# Patient Record
Sex: MALE | Race: WHITE | NOT HISPANIC OR LATINO | Employment: OTHER | URBAN - METROPOLITAN AREA
[De-identification: names, ages, dates, MRNs, and addresses within clinical notes are randomized per-mention and may not be internally consistent; named-entity substitution may affect disease eponyms.]

---

## 2017-08-21 ENCOUNTER — OFFICE VISIT (OUTPATIENT)
Dept: URGENT CARE | Facility: RETAIL CLINIC | Age: 42
End: 2017-08-21
Payer: COMMERCIAL

## 2017-08-21 VITALS
DIASTOLIC BLOOD PRESSURE: 91 MMHG | SYSTOLIC BLOOD PRESSURE: 144 MMHG | TEMPERATURE: 98.5 F | OXYGEN SATURATION: 98 % | HEART RATE: 95 BPM
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
## 2017-08-21 PROCEDURE — 99202 OFFICE O/P NEW SF 15 MIN: CPT | Performed by: NURSE PRACTITIONER

## 2017-08-21 RX ORDER — PREDNISONE 20 MG/1
40 TABLET ORAL DAILY
Qty: 10 TABLET | Refills: 0 | Status: SHIPPED | OUTPATIENT
Start: 2017-08-21 | End: 2017-08-26

## 2017-08-21 RX ORDER — TRIAMCINOLONE ACETONIDE 1 MG/G
OINTMENT TOPICAL
Qty: 80 G | Refills: 0 | Status: SHIPPED | OUTPATIENT
Start: 2017-08-21 | End: 2017-11-22

## 2017-08-21 NOTE — PROGRESS NOTES
SUBJECTIVE  Beny Nelson is a 42 year old male who presents with a chief complaint of an animal bite on the right hand finger.  .jose c was bitten by a bee or spider yesterday.   Cicumstances of bite: unprovoked attack.  Severity: mild, moderate.    Associated symptoms: redness noted, swelling and edema at site    tetanus status unknown to the patient    Past Medical History:   Diagnosis Date     Bariatric surgery status        Current Outpatient Prescriptions:      VIAGRA 100 MG tablet, TAKE 1/2 TABLET BY MOUTH ONCE A DAY AS NEEDED - TAKE AS DIRECTED. (Patient not taking: Reported on 8/21/2017), Disp: 10 tablet, Rfl: 5     ibuprofen (ADVIL) 200 MG tablet, Take 200 mg by mouth every 4 hours as needed., Disp: , Rfl:   History   Smoking Status     Never Smoker   Smokeless Tobacco     Never Used     Comment: no smokers in household     ROS:  Review of systems negative except as stated above.    OBJECTIVE:  BP (!) 144/91  Pulse 95  Temp 98.5  F (36.9  C) (Tympanic)  SpO2 98%  GENERAL: healthy, alert no acute distress  SKIN: insect bite of right hand 4th finger  GENERAL APPEARANCE: alert, active, mild distress and cooperative  EYES: EOMI,  PERRL, conjunctiva clear  NECK: supple, nontender, no lymphadenopathy  MS: extremities normal- no gross deformities noted,  Right hand is mildly swollen & tight  NEURO: Normal strength and tone, sensory exam grossly normal,  normal speech and mentation    ASSESSMENT:  Insect bite, initial encounter      PLAN:  Soaking & medications & there use discussed.  Follow up with primary care provider in no improvement.    Jose Manuel Rothman MSN, APRN, Family NP-C  [facility]

## 2017-08-21 NOTE — NURSING NOTE
"Chief Complaint   Patient presents with     Insect Bites     bite on finger of right hand yesterday and right knee       Initial BP (!) 144/91  Pulse 95  Temp 98.5  F (36.9  C) (Tympanic)  SpO2 98% Estimated body mass index is 30.46 kg/(m^2) as calculated from the following:    Height as of 3/14/13: 5' 3.19\" (1.605 m).    Weight as of 3/14/13: 173 lb (78.5 kg).  Medication Reconciliation: complete   Lisa Johnson      "

## 2017-08-21 NOTE — MR AVS SNAPSHOT
"              After Visit Summary   2017    Beny Nelson    MRN: [id number]           Patient Information     Date Of Birth          1975        Visit Information        Provider Department      2017 10:10 AM Jose Manuel Rothman APRN [facility]        Today's Diagnoses     Insect bite, initial encounter    -  1       Follow-ups after your visit        Who to contact     You can reach your care team any time of the day by calling [phone].  Notification of test results:  If you have an abnormal lab result, we will notify you by phone as soon as possible.         Additional Information About Your Visit        MyChart Information     [employer] lets you send messages to your doctor, view your test results, renew your prescriptions, schedule appointments and more. To sign up, go to [email or web address] . Click on \"Log in\" on the left side of the screen, which will take you to the Welcome page. Then click on \"Sign up Now\" on the right side of the page.     You will be asked to enter the access code listed below, as well as some personal information. Please follow the directions to create your username and password.     Your access code is: [email or web address]  Expires: 2017 10:38 AM     Your access code will  in 90 days. If you need help or a new code, please call your [place] clinic or [phone].        Care EveryWhere ID     This is your [facility] EveryWhere ID. This could be used by other organizations to access your [place] medical records  [id number]        Your Vitals Were     Pulse Temperature Pulse Oximetry             95 98.5  F (36.9  C) (Tympanic) 98%          Blood Pressure from Last 3 Encounters:   17 (!) 144/91   13 (!) 156/95   13 125/85    Weight from Last 3 Encounters:   13 173 lb (78.5 kg)   02/09/10 172 lb (78 kg)   08 160 lb (72.6 kg)              Today, you had the following     No orders found for display       "   Today's Medication Changes          These changes are accurate as of: 8/21/17 10:38 AM.  If you have any questions, ask your nurse or doctor.               Start taking these medicines.        Dose/Directions    predniSONE 20 MG tablet   Commonly known as:  DELTASONE   Used for:  Insect bite, initial encounter   Started by:  Jose Manuel Rothman APRN CNP        Dose:  40 mg   Take 2 tablets (40 mg) by mouth daily for 5 days   Quantity:  10 tablet   Refills:  0       triamcinolone 0.1 % ointment   Commonly known as:  KENALOG   Used for:  Insect bite, initial encounter   Started by:  Jose Manuel Rothman APRN CNP        Apply sparingly to affected area three times daily for 14 days.   Quantity:  80 g   Refills:  0            Where to get your medicines      These medications were sent to [address]  [address] [facility] [address]     Phone:  [phone]     predniSONE 20 MG tablet    triamcinolone 0.1 % ointment                Primary Care Provider Office Phone # Fax #    Alejo Hobson [phone]       XXX RESIGNED  [facility] [address]        Equal Access to Services     LORIN PADGETT AH: Hadii beau ku hadasho Soomaali, waaxda luqadaha, qaybta kaalmada adeegyada, zaheer mcwilliams. So [facility] [phone].    ATENCIÓN: Si habla español, tiene a urrutia disposición servicios gratuitos de asistencia lingüística. Jaylan al [phone].    We comply with applicable federal civil rights laws and [place] laws. We do not discriminate on the basis of race, color, national origin, age, disability sex, sexual orientation or gender identity.            Thank you!     Thank you for choosing [facility]  for your care. Our goal is always to provide you with excellent care. Hearing back from our patients is one way we can continue to improve our services. Please take a few minutes to complete the written  survey that you may receive in the mail after your visit with us. Thank you!             Your Updated Medication List - Protect others around you: Learn how to safely use, store and throw away your medicines at www.disposemymeds.org.          This list is accurate as of: 8/21/17 10:38 AM.  Always use your most recent med list.                   Brand Name Dispense Instructions for use Diagnosis    ADVIL 200 MG tablet   Generic drug:  ibuprofen      Take 200 mg by mouth every 4 hours as needed.        predniSONE 20 MG tablet    DELTASONE    10 tablet    Take 2 tablets (40 mg) by mouth daily for 5 days    Insect bite, initial encounter       triamcinolone 0.1 % ointment    KENALOG    80 g    Apply sparingly to affected area three times daily for 14 days.    Insect bite, initial encounter       VIAGRA 100 MG cap/tab   Generic drug:  sildenafil     10 tablet    TAKE 1/2 TABLET BY MOUTH ONCE A DAY AS NEEDED - TAKE AS DIRECTED.    Impotence of organic origin

## 2017-11-22 ENCOUNTER — OFFICE VISIT (OUTPATIENT)
Dept: FAMILY MEDICINE | Facility: CLINIC | Age: 42
End: 2017-11-22
Payer: COMMERCIAL

## 2017-11-22 DIAGNOSIS — L98.9 SKIN LESION: ICD-10-CM

## 2017-11-22 DIAGNOSIS — Z12.5 SPECIAL SCREENING FOR MALIGNANT NEOPLASM OF PROSTATE: ICD-10-CM

## 2017-11-22 DIAGNOSIS — Z00.00 ROUTINE GENERAL MEDICAL EXAMINATION AT A HEALTH CARE FACILITY: Primary | ICD-10-CM

## 2017-11-22 DIAGNOSIS — D50.9 IRON DEFICIENCY ANEMIA, UNSPECIFIED IRON DEFICIENCY ANEMIA TYPE: ICD-10-CM

## 2017-11-22 LAB
ALBUMIN SERPL-MCNC: 4.2 G/DL (ref 3.4–5)
ALBUMIN UR-MCNC: NEGATIVE MG/DL
ALP SERPL-CCNC: 99 U/L (ref 40–150)
ALT SERPL W P-5'-P-CCNC: 36 U/L (ref 0–70)
ANION GAP SERPL CALCULATED.3IONS-SCNC: 8 MMOL/L (ref 3–14)
APPEARANCE UR: CLEAR
AST SERPL W P-5'-P-CCNC: 35 U/L (ref 0–45)
BASOPHILS # BLD AUTO: 0.1 10E9/L (ref 0–0.2)
BASOPHILS NFR BLD AUTO: 0.7 %
BILIRUB SERPL-MCNC: 0.6 MG/DL (ref 0.2–1.3)
BILIRUB UR QL STRIP: NEGATIVE
BUN SERPL-MCNC: 12 MG/DL (ref 7–30)
CALCIUM SERPL-MCNC: 9 MG/DL (ref 8.5–10.1)
CHLORIDE SERPL-SCNC: 103 MMOL/L (ref 94–109)
CHOLEST SERPL-MCNC: 163 MG/DL
CO2 SERPL-SCNC: 27 MMOL/L (ref 20–32)
COLOR UR AUTO: NORMAL
CREAT SERPL-MCNC: 1.03 MG/DL (ref 0.66–1.25)
DIFFERENTIAL METHOD BLD: ABNORMAL
EOSINOPHIL # BLD AUTO: 0.3 10E9/L (ref 0–0.7)
EOSINOPHIL NFR BLD AUTO: 2.4 %
ERYTHROCYTE [DISTWIDTH] IN BLOOD BY AUTOMATED COUNT: 19.6 % (ref 10–15)
GFR SERPL CREATININE-BSD FRML MDRD: 78 ML/MIN/1.7M2
GLUCOSE SERPL-MCNC: 84 MG/DL (ref 70–99)
GLUCOSE UR STRIP-MCNC: NEGATIVE MG/DL
HCT VFR BLD AUTO: 41.3 % (ref 40–53)
HDLC SERPL-MCNC: 68 MG/DL
HGB BLD-MCNC: 12.7 G/DL (ref 13.3–17.7)
HGB UR QL STRIP: NEGATIVE
IMM GRANULOCYTES # BLD: 0 10E9/L (ref 0–0.4)
IMM GRANULOCYTES NFR BLD: 0.4 %
KETONES UR STRIP-MCNC: NEGATIVE MG/DL
LDLC SERPL CALC-MCNC: 60 MG/DL
LEUKOCYTE ESTERASE UR QL STRIP: NEGATIVE
LYMPHOCYTES # BLD AUTO: 2.4 10E9/L (ref 0.8–5.3)
LYMPHOCYTES NFR BLD AUTO: 21.7 %
MCH RBC QN AUTO: 20.6 PG (ref 26.5–33)
MCHC RBC AUTO-ENTMCNC: 30.8 G/DL (ref 31.5–36.5)
MCV RBC AUTO: 67 FL (ref 78–100)
MONOCYTES # BLD AUTO: 1.5 10E9/L (ref 0–1.3)
MONOCYTES NFR BLD AUTO: 13.4 %
NEUTROPHILS # BLD AUTO: 6.7 10E9/L (ref 1.6–8.3)
NEUTROPHILS NFR BLD AUTO: 61.4 %
NITRATE UR QL: NEGATIVE
NONHDLC SERPL-MCNC: 95 MG/DL
PH UR STRIP: 6 PH (ref 5–7)
PLATELET # BLD AUTO: 302 10E9/L (ref 150–450)
POTASSIUM SERPL-SCNC: 3.8 MMOL/L (ref 3.4–5.3)
PROT SERPL-MCNC: 8 G/DL (ref 6.8–8.8)
PSA SERPL-ACNC: 1.03 UG/L (ref 0–4)
RBC # BLD AUTO: 6.18 10E12/L (ref 4.4–5.9)
SODIUM SERPL-SCNC: 138 MMOL/L (ref 133–144)
SOURCE: NORMAL
SP GR UR STRIP: 1.01 (ref 1–1.03)
TRIGL SERPL-MCNC: 176 MG/DL
UROBILINOGEN UR STRIP-MCNC: 0 MG/DL (ref 0–2)
WBC # BLD AUTO: 10.9 10E9/L (ref 4–11)

## 2017-11-22 PROCEDURE — G0103 PSA SCREENING: HCPCS | Performed by: FAMILY MEDICINE

## 2017-11-22 PROCEDURE — 36415 COLL VENOUS BLD VENIPUNCTURE: CPT | Performed by: FAMILY MEDICINE

## 2017-11-22 PROCEDURE — 80053 COMPREHEN METABOLIC PANEL: CPT | Performed by: FAMILY MEDICINE

## 2017-11-22 PROCEDURE — 88305 TISSUE EXAM BY PATHOLOGIST: CPT | Performed by: FAMILY MEDICINE

## 2017-11-22 PROCEDURE — 85025 COMPLETE CBC W/AUTO DIFF WBC: CPT | Performed by: FAMILY MEDICINE

## 2017-11-22 PROCEDURE — 11440 EXC FACE-MM B9+MARG 0.5 CM/<: CPT | Performed by: FAMILY MEDICINE

## 2017-11-22 PROCEDURE — 80061 LIPID PANEL: CPT | Performed by: FAMILY MEDICINE

## 2017-11-22 PROCEDURE — 99396 PREV VISIT EST AGE 40-64: CPT | Mod: 25 | Performed by: FAMILY MEDICINE

## 2017-11-22 PROCEDURE — 81003 URINALYSIS AUTO W/O SCOPE: CPT | Performed by: FAMILY MEDICINE

## 2017-11-22 ASSESSMENT — PAIN SCALES - GENERAL: PAINLEVEL: NO PAIN (0)

## 2017-11-22 NOTE — NURSING NOTE
"Chief Complaint   Patient presents with     Physical       Initial /82 (BP Location: Right arm, Patient Position: Chair, Cuff Size: Adult Regular)  Pulse 92  Temp 97.6  F (36.4  C) (Temporal)  Resp 16  Ht 5' 3\" (1.6 m)  Wt 186 lb 1.6 oz (84.4 kg)  BMI 32.97 kg/m2 Estimated body mass index is 32.97 kg/(m^2) as calculated from the following:    Height as of this encounter: 5' 3\" (1.6 m).    Weight as of this encounter: 186 lb 1.6 oz (84.4 kg).  Medication Reconciliation: complete  "

## 2017-11-22 NOTE — PROGRESS NOTES
SUBJECTIVE:   CC: Beny Nelson is an 42 year old male who presents for preventative health visit. He has no c/o other than a skin lesion on his face.    Healthy Habits:    Do you get at least three servings of calcium containing foods daily (dairy, green leafy vegetables, etc.)? yes    Amount of exercise or daily activities, outside of work: 2-3 day(s) per week    Problems taking medications regularly No    Medication side effects: No    Have you had an eye exam in the past two years? no    Do you see a dentist twice per year? yes    Do you have sleep apnea, excessive snoring or daytime drowsiness?no              Today's PHQ-2 Score: PHQ-2 ( 1999 [employer]) 11/22/2017 3/14/2013   Q1: Little interest or pleasure in doing things 0 0   Q2: Feeling down, depressed or hopeless 0 0   PHQ-2 Score 0 0   Q1: Little interest or pleasure in doing things Not at all -   Q2: Feeling down, depressed or hopeless Not at all -   PHQ-2 Score 0 -       Abuse: Current or Past(Physical, Sexual or Emotional)- No  Do you feel safe in your environment - Yes    Social History   Substance Use Topics     Smoking status: Never Smoker     Smokeless tobacco: Never Used      Comment: no smokers in household     Alcohol use Yes     The patient does not drink >3 drinks per day nor >7 drinks per week.    Last PSA: No results found for: PSA    Reviewed orders with patient. Reviewed health maintenance and updated orders accordingly - Yes  BP Readings from Last 3 Encounters:   11/22/17 (P) 158/82   08/21/17 (!) 144/91   11/14/13 (!) 156/95    Wt Readings from Last 3 Encounters:   11/22/17 186 lb 1.6 oz (84.4 kg)   03/14/13 173 lb (78.5 kg)   02/09/10 172 lb (78 kg)                  Patient Active Problem List   Diagnosis     Bariatric surgery status     Wrist tendonitis     CARDIOVASCULAR SCREENING; LDL GOAL LESS THAN 160     Erectile dysfunction     Onychomycosis     Past Surgical History:   Procedure Laterality Date     GASTRIC BYPASS  2005      "  Social History   Substance Use Topics     Smoking status: Never Smoker     Smokeless tobacco: Never Used      Comment: no smokers in household     Alcohol use Yes     Family History   Problem Relation Age of Onset     DIABETES Father      CANCER Paternal Grandfather          Current Outpatient Prescriptions   Medication Sig Dispense Refill     VIAGRA 100 MG tablet TAKE 1/2 TABLET BY MOUTH ONCE A DAY AS NEEDED - TAKE AS DIRECTED. 10 tablet 5     ibuprofen (ADVIL) 200 MG tablet Take 200 mg by mouth every 4 hours as needed.       No Known Allergies      Reviewed and updated as needed this visit by clinical staffTobacco  Allergies  Meds  Soc Hx        Reviewed and updated as needed this visit by Provider          FH: FA with DM, HTN and CAD    GF with cancer hx    ROS:  C: NEGATIVE for fever, chills, change in weight  I: small skin lesion on rt preauricular area  E: NEGATIVE for vision changes or irritation  ENT: NEGATIVE for ear, mouth and throat problems  R: NEGATIVE for significant cough or SOB  CV: NEGATIVE for chest pain, palpitations or peripheral edema  GI: NEGATIVE for nausea, abdominal pain, heartburn, or change in bowel habits   male: negative for dysuria, hematuria, decreased urinary stream, erectile dysfunction, urethral discharge; has some occasional urinary hesitance at night   M: NEGATIVE for significant arthralgias or myalgia  N: NEGATIVE for weakness, dizziness or paresthesias  P: NEGATIVE for changes in mood or affect    OBJECTIVE:   /82 (BP Location: Right arm, Patient Position: Chair, Cuff Size: Adult Regular)  Pulse 92  Temp 97.6  F (36.4  C) (Temporal)  Resp 16  Ht 5' 3\" (1.6 m)  Wt 186 lb 1.6 oz (84.4 kg)  BMI 32.97 kg/m2   Rep bp 140/88  EXAM:  GENERAL: healthy, alert and no distress  EYES: Eyes grossly normal to inspection, PERRL and conjunctivae and sclerae normal  HENT: ear canals and TM's normal, nose and mouth without ulcers or lesions  NECK: no adenopathy, no asymmetry, " masses, or scars and thyroid normal to palpation  RESP: lungs clear to auscultation - no rales, rhonchi or wheezes  CV: regular rate and rhythm, normal S1 S2, no S3 or S4, no murmur, click or rub, no peripheral edema and peripheral pulses strong  ABDOMEN: soft, nontender, no hepatosplenomegaly, no masses and bowel sounds normal  Gen: nl   Rectal prostate nl   MS: no gross musculoskeletal defects noted, no edema  SKIN: 3 mm waxy lesion left preauricular area, biopsied  NEURO: Normal strength and tone, mentation intact and speech normal  PSYCH: mentation appears normal, affect normal/bright    ASSESSMENT/PLAN:       ICD-10-CM    1. Routine general medical examination at a health care facility Z00.00 CBC with platelets differential     Lipid Profile     Comprehensive metabolic panel     UA reflex to Microscopic ([facility]; [facility])     CANCELED: *UA reflex to Microscopic   2. Special screening for malignant neoplasm of prostate Z12.5 Prostate spec antigen screen   3. Skin lesion L98.9 Surgical pathology exam     EXC BENIGN SKIN LESION FACE/EARS <=0.5 CM     PLAN: labs drawn   low na diet, htn inrormation given    bp diary, given  goals, cb if out    Skin lesion excised:    3 mm irregular shaped, pale colored  lesion removed from left preauricular with shave biopsy technique  under sterile conditions and local anesthesia. Depth of excision 1 mm. No significant bleeding. Wound base cauterized. Wound instructions given. Pt to call back if any problems or signs of infection.   Specimen sent for analysis.      COUNSELING:  Reviewed preventive health counseling, as reflected in patient instructions       Regular exercise       Healthy diet/nutrition       Immunizations    Declined: TDAP due to time constraints today, will get at his pharmacy, declined flu imm.             Prostate cancer screening      BP Screening:   Last 3 BP Readings:    BP Readings from Last 3 Encounters:   11/22/17 (P) 158/82   08/21/17 (!)  "144/91   11/14/13 (!) 156/95       The following was recommended to the patient:    Wt control, salt avoidance, exercise    Information given  bp diary       reports that he has never smoked. He has never used smokeless tobacco.      Estimated body mass index is 32.97 kg/(m^2) as calculated from the following:    Height as of this encounter: 5' 3\" (1.6 m).    Weight as of this encounter: 186 lb 1.6 oz (84.4 kg).   Weight management plan: Discussed healthy diet and exercise guidelines and patient will follow up in 3 months in clinic to re-evaluate.    Counseling Resources:  ATP IV Guidelines  Pooled Cohorts Equation Calculator  FRAX Risk Assessment  ICSI Preventive Guidelines  Dietary Guidelines for Americans, 2010  [employer] MyPlate  ASA Prophylaxis  Lung CA Screening    Rodney Hart MD  [facility] for HPI/ROS submitted by the patient on 11/22/2017   Annual Exam:  Getting at least 3 servings of Calcium per day:: Yes  Bi-annual eye exam:: NO  Dental care twice a year:: Yes  Sleep apnea or symptoms of sleep apnea:: None  Diet:: Regular (no restrictions)  Frequency of exercise:: 2-3 days/week  Taking medications regularly:: Yes  Medication side effects:: Not applicable  Additional concerns today:: YES  PHQ-2 Score: 0  Duration of exercise:: 30-45 minutes    Answers for HPI/ROS submitted by the patient on 11/22/2017   Annual Exam:  Getting at least 3 servings of Calcium per day:: Yes  Bi-annual eye exam:: NO  Dental care twice a year:: Yes  Sleep apnea or symptoms of sleep apnea:: None  Diet:: Regular (no restrictions)  Frequency of exercise:: 2-3 days/week  Taking medications regularly:: Yes  Medication side effects:: Not applicable  Additional concerns today:: YES  PHQ-2 Score: 0  Duration of exercise:: 30-45 minutes      ADD: apparently my telephone encounter did not come through   pt with gradual fall in hgb now at 12.7  He has no symptoms from this  It is possible this is a result of his " bariatric surgery, but in my opinion GI blood loss needs to ruled out  I recommended he have upper and lower endo's    Also he has borderline htn, and he will need f/u re that  dbue

## 2017-11-22 NOTE — MR AVS SNAPSHOT
After Visit Summary   11/22/2017    Beny Nelson    MRN: [id number]           Patient Information     Date Of Birth          1975        Visit Information        Provider Department      11/22/2017 5:30 PM Rodney Hart MD [facility]        Today's Diagnoses     Routine general medical examination at a health care facility    -  1    Special screening for malignant neoplasm of prostate        Skin lesion          Care Instructions      Preventive Health Recommendations  Male Ages 40 to 49    Yearly exam:             See your health care provider every year in order to  o   Review health changes.   o   Discuss preventive care.    o   Review your medicines if your doctor has prescribed any.    You should be tested each year for STDs (sexually transmitted diseases) if you re at risk.     Have a cholesterol test every 5 years.     Have a colonoscopy (test for colon cancer) if someone in your family has had colon cancer or polyps before age 50.     After age 45, have a diabetes test (fasting glucose). If you are at risk for diabetes, you should have this test every 3 years.      Talk with your health care provider about whether or not a prostate cancer screening test (PSA) is right for you.    Shots: Get a flu shot each year. Get a tetanus shot every 10 years.     Nutrition:    Eat at least 5 servings of fruits and vegetables daily.     Eat whole-grain bread, whole-wheat pasta and brown rice instead of white grains and rice.     Talk to your provider about Calcium and Vitamin D.     Lifestyle    Exercise for at least 150 minutes a week (30 minutes a day, 5 days a week). This will help you control your weight and prevent disease.     Limit alcohol to one drink per day.     No smoking.     Wear sunscreen to prevent skin cancer.     See your dentist every six months for an exam and cleaning.              Follow-ups after your visit        Who to contact     If you have questions or  "need follow up information about today's clinic visit or your schedule please contact [facility] directly at [phone].  Normal or non-critical lab and imaging results will be communicated to you by [employer], letter or phone within 4 business days after the clinic has received the results. If you do not hear from us within 7 days, please contact the clinic through [employer] or phone. If you have a critical or abnormal lab result, we will notify you by phone as soon as possible.  Submit refill requests through [employer] or call your pharmacy and they will forward the refill request to us. Please allow 3 business days for your refill to be completed.          Additional Information About Your Visit        [employer] Information     [employer] lets you send messages to your doctor, view your test results, renew your prescriptions, schedule appointments and more. To sign up, go to [email or web address] . Click on \"Log in\" on the left side of the screen, which will take you to the Welcome page. Then click on \"Sign up Now\" on the right side of the page.     You will be asked to enter the access code listed below, as well as some personal information. Please follow the directions to create your username and password.     Your access code is: [email or web address]  Expires: 2018  6:50 PM     Your access code will  in 90 days. If you need help or a new code, please call your [place] clinic or [phone].        Care EveryWhere ID     This is your Care EveryWhere ID. This could be used by other organizations to access your [place] medical records  [id number]        Your Vitals Were     Pulse Temperature Respirations Height BMI (Body Mass Index)       92 97.6  F (36.4  C) (Temporal) 16 5' 3\" (1.6 m) 32.97 kg/m2        Blood Pressure from Last 3 Encounters:   17 (P) 158/82   17 (!) 144/91   13 (!) 156/95    Weight from Last 3 Encounters:   17 186 lb 1.6 oz (84.4 kg)   13 173 lb " (78.5 kg)   02/09/10 172 lb (78 kg)              We Performed the Following     *UA reflex to Microscopic     CBC with platelets differential     Comprehensive metabolic panel     EXC BENIGN SKIN LESION FACE/EARS <=0.5 CM     Lipid Profile     Prostate spec antigen screen     Surgical pathology exam        Primary Care Provider Office Phone # Fax #    NicolasLupillo Hobson [phone]       XXX RESIGNED  [facility] [id number]        Equal Access to Services     LORIN PADGETT : Hadii aad ku hadasho Soomaali, waaxda luqadaha, qaybta kaalmada adeegyada, waxay idiin hayaan adeeg kharacharly latai . So [facility] [phone].    ATENCIÓN: Si habla español, tiene a urrutia disposición servicios gratuitos de asistencia lingüística. [facility] [phone].    We comply with applicable federal civil rights laws and [place] laws. We do not discriminate on the basis of race, color, national origin, age, disability, sex, sexual orientation, or gender identity.            Thank you!     Thank you for choosing [facility]  for your care. Our goal is always to provide you with excellent care. Hearing back from our patients is one way we can continue to improve our services. Please take a few minutes to complete the written survey that you may receive in the mail after your visit with us. Thank you!             Your Updated Medication List - Protect others around you: Learn how to safely use, store and throw away your medicines at www.disposemymeds.org.          This list is accurate as of: 11/22/17  6:50 PM.  Always use your most recent med list.                   Brand Name Dispense Instructions for use Diagnosis    ADVIL 200 MG tablet   Generic drug:  ibuprofen      Take 200 mg by mouth every 4 hours as needed.        VIAGRA 100 MG tablet   Generic drug:  sildenafil     10 tablet    TAKE 1/2 TABLET BY MOUTH ONCE A DAY AS NEEDED - TAKE AS DIRECTED.    Impotence of organic origin

## 2017-11-23 VITALS
WEIGHT: 186.1 LBS | SYSTOLIC BLOOD PRESSURE: 140 MMHG | RESPIRATION RATE: 16 BRPM | HEART RATE: 92 BPM | BODY MASS INDEX: 32.97 KG/M2 | TEMPERATURE: 97.6 F | HEIGHT: 63 IN | DIASTOLIC BLOOD PRESSURE: 88 MMHG

## 2017-11-24 ENCOUNTER — TELEPHONE (OUTPATIENT)
Dept: FAMILY MEDICINE | Facility: CLINIC | Age: 42
End: 2017-11-24

## 2017-11-24 DIAGNOSIS — D50.9 MICROCYTIC ANEMIA: Primary | ICD-10-CM

## 2017-11-24 NOTE — TELEPHONE ENCOUNTER
Surgery Scheduled    Date of Surgery 12/8Time of Surgery 1:30  Procedure: upper GI and colonoscopy   Hospital/Surgical Facility: [facility]   Surgeon: Dr Jason MARIO printed and mailed  Surgery packet mailed to patient's home address. Patients instructed to arrive 1 hours prior to surgery. Patient understood and agrees to plan.    Candelaria Gill  Surgery Scheduler

## 2017-11-24 NOTE — LETTER

## 2017-11-24 NOTE — LETTER
[address] [address] [phone]        November 24, 2017    Beny Nelson  [address]  [facility] [address]

## 2017-11-26 PROBLEM — D50.9 IRON DEFICIENCY ANEMIA, UNSPECIFIED IRON DEFICIENCY ANEMIA TYPE: Status: ACTIVE | Noted: 2017-11-26

## 2017-11-27 LAB — COPATH REPORT: NORMAL

## 2017-11-28 ENCOUNTER — TELEPHONE (OUTPATIENT)
Dept: FAMILY MEDICINE | Facility: CLINIC | Age: 42
End: 2017-11-28

## 2017-11-28 NOTE — TELEPHONE ENCOUNTER
----- Message from Rodney Hart MD sent at 11/27/2017  8:45 PM CST -----  Please tell Beny Burnskristian his skin biopsy was okay.  It was an overactive oil gland, no skin cancer found

## 2017-12-01 DIAGNOSIS — D50.9 IRON DEFICIENCY ANEMIA, UNSPECIFIED IRON DEFICIENCY ANEMIA TYPE: Primary | ICD-10-CM

## 2017-12-04 DIAGNOSIS — D50.9 IRON DEFICIENCY ANEMIA, UNSPECIFIED IRON DEFICIENCY ANEMIA TYPE: ICD-10-CM

## 2017-12-04 LAB
FOLATE SERPL-MCNC: 12.8 NG/ML
IRON SATN MFR SERPL: 5 % (ref 15–46)
IRON SERPL-MCNC: 22 UG/DL (ref 35–180)
TIBC SERPL-MCNC: 450 UG/DL (ref 240–430)
VIT B12 SERPL-MCNC: 529 PG/ML (ref 193–986)

## 2017-12-04 PROCEDURE — 83550 IRON BINDING TEST: CPT | Performed by: FAMILY MEDICINE

## 2017-12-04 PROCEDURE — 82746 ASSAY OF FOLIC ACID SERUM: CPT | Performed by: FAMILY MEDICINE

## 2017-12-04 PROCEDURE — 83540 ASSAY OF IRON: CPT | Performed by: FAMILY MEDICINE

## 2017-12-04 PROCEDURE — 82607 VITAMIN B-12: CPT | Performed by: FAMILY MEDICINE

## 2017-12-04 PROCEDURE — 36415 COLL VENOUS BLD VENIPUNCTURE: CPT | Performed by: FAMILY MEDICINE

## 2017-12-05 ENCOUNTER — TELEPHONE (OUTPATIENT)
Dept: FAMILY MEDICINE | Facility: CLINIC | Age: 42
End: 2017-12-05

## 2017-12-05 DIAGNOSIS — D50.9 IRON DEFICIENCY ANEMIA, UNSPECIFIED IRON DEFICIENCY ANEMIA TYPE: Primary | ICD-10-CM

## 2017-12-05 NOTE — TELEPHONE ENCOUNTER
Reason for Call:  Request for results: Labs for iron & iron binding capacity    Name of test or procedure: Labs     Date of test of procedure: 12.4    Location of the test or procedure: [facility]    OK to leave the result message on voice mail or with a family member? YES    Phone number Patient can be reached at:  Cell number on file:    Telephone Information:   Mobile [phone]       Additional comments: Patient is calling about results of the lab from 12.4. Please advise.    Call taken on 12/5/2017 at 12:41 PM by Amarilis Rose

## 2017-12-06 DIAGNOSIS — D50.9 IRON DEFICIENCY ANEMIA, UNSPECIFIED IRON DEFICIENCY ANEMIA TYPE: ICD-10-CM

## 2017-12-06 LAB — HEMOCCULT STL QL: NEGATIVE

## 2017-12-06 PROCEDURE — 82272 OCCULT BLD FECES 1-3 TESTS: CPT

## 2017-12-08 ENCOUNTER — SURGERY (OUTPATIENT)
Age: 42
End: 2017-12-08

## 2017-12-08 ENCOUNTER — HOSPITAL ENCOUNTER (OUTPATIENT)
Facility: CLINIC | Age: 42
Discharge: HOME | End: 2017-12-08
Attending: INTERNAL MEDICINE | Admitting: INTERNAL MEDICINE
Payer: COMMERCIAL

## 2017-12-08 VITALS
RESPIRATION RATE: 14 BRPM | SYSTOLIC BLOOD PRESSURE: 135 MMHG | BODY MASS INDEX: 32.97 KG/M2 | HEIGHT: 63 IN | WEIGHT: 186.1 LBS | OXYGEN SATURATION: 96 % | TEMPERATURE: 98.1 F | DIASTOLIC BLOOD PRESSURE: 84 MMHG

## 2017-12-08 LAB
COLONOSCOPY: NORMAL
UPPER GI ENDOSCOPY: NORMAL

## 2017-12-08 PROCEDURE — 25000125 ZZHC RX 250: Performed by: INTERNAL MEDICINE

## 2017-12-08 PROCEDURE — 45378 DIAGNOSTIC COLONOSCOPY: CPT | Performed by: INTERNAL MEDICINE

## 2017-12-08 PROCEDURE — 43235 EGD DIAGNOSTIC BRUSH WASH: CPT | Performed by: INTERNAL MEDICINE

## 2017-12-08 PROCEDURE — 40000296 ZZH STATISTIC ENDO RECOVERY CLASS 1:2 FIRST HOUR: Performed by: INTERNAL MEDICINE

## 2017-12-08 PROCEDURE — 25000128 H RX IP 250 OP 636: Performed by: INTERNAL MEDICINE

## 2017-12-08 PROCEDURE — 99153 MOD SED SAME PHYS/QHP EA: CPT

## 2017-12-08 PROCEDURE — G0500 MOD SEDAT ENDO SERVICE >5YRS: HCPCS

## 2017-12-08 RX ORDER — LIDOCAINE 40 MG/G
CREAM TOPICAL
Status: DISCONTINUED | OUTPATIENT
Start: 2017-12-08 | End: 2017-12-08 | Stop reason: HOSPADM

## 2017-12-08 RX ORDER — FENTANYL CITRATE 50 UG/ML
INJECTION, SOLUTION INTRAMUSCULAR; INTRAVENOUS PRN
Status: DISCONTINUED | OUTPATIENT
Start: 2017-12-08 | End: 2017-12-08 | Stop reason: HOSPADM

## 2017-12-08 RX ORDER — ONDANSETRON 2 MG/ML
4 INJECTION INTRAMUSCULAR; INTRAVENOUS
Status: DISCONTINUED | OUTPATIENT
Start: 2017-12-08 | End: 2017-12-08 | Stop reason: HOSPADM

## 2017-12-08 RX ADMIN — LIDOCAINE HYDROCHLORIDE 1 ML: 10 INJECTION, SOLUTION EPIDURAL; INFILTRATION; INTRACAUDAL; PERINEURAL at 12:35

## 2017-12-08 RX ADMIN — MIDAZOLAM 2 MG: 1 INJECTION INTRAMUSCULAR; INTRAVENOUS at 13:26

## 2017-12-08 RX ADMIN — MIDAZOLAM 1 MG: 1 INJECTION INTRAMUSCULAR; INTRAVENOUS at 13:16

## 2017-12-08 RX ADMIN — FENTANYL CITRATE 25 MCG: 50 INJECTION, SOLUTION INTRAMUSCULAR; INTRAVENOUS at 13:20

## 2017-12-08 RX ADMIN — LIDOCAINE HYDROCHLORIDE 5 ML: 20 SOLUTION ORAL; TOPICAL at 13:14

## 2017-12-08 RX ADMIN — FENTANYL CITRATE 25 MCG: 50 INJECTION, SOLUTION INTRAMUSCULAR; INTRAVENOUS at 13:18

## 2017-12-08 RX ADMIN — MIDAZOLAM 1 MG: 1 INJECTION INTRAMUSCULAR; INTRAVENOUS at 13:30

## 2017-12-08 RX ADMIN — MIDAZOLAM 1 MG: 1 INJECTION INTRAMUSCULAR; INTRAVENOUS at 13:17

## 2017-12-08 RX ADMIN — MIDAZOLAM 1 MG: 1 INJECTION INTRAMUSCULAR; INTRAVENOUS at 13:18

## 2017-12-08 RX ADMIN — MIDAZOLAM 2 MG: 1 INJECTION INTRAMUSCULAR; INTRAVENOUS at 13:15

## 2017-12-08 RX ADMIN — FENTANYL CITRATE 50 MCG: 50 INJECTION, SOLUTION INTRAMUSCULAR; INTRAVENOUS at 13:15

## 2017-12-08 NOTE — CONSULTS
"[facility] GI Pre-Procedure Physical Assessment    Beny Nelson MRN# [id number]   Age: 42 year old YOB: 1975      Date of Surgery: 12/8/2017  Location [facility]      Date of Exam 12/8/2017 Facility (Same day)       Home clinic: [facility]  Primary care provider: Alejo Mcmullen         Active problem list:   Patient Active Problem List   Diagnosis     Bariatric surgery status     Wrist tendonitis     CARDIOVASCULAR SCREENING; LDL GOAL LESS THAN 160     Erectile dysfunction     Onychomycosis     Iron deficiency anemia, unspecified iron deficiency anemia type            Medications (include herbals and vitamins):   Any Plavix use in the last 7 days?  No     Current Facility-Administered Medications   Medication     lidocaine 1 % 1 mL     lidocaine (LMX4) kit     sodium chloride (PF) 0.9% PF flush 3 mL     sodium chloride (PF) 0.9% PF flush 3 mL     ondansetron (ZOFRAN) injection 4 mg             Allergies:    No Known Allergies  Allergy to Latex?  No  Allergy to tape?    No          Social History:     Social History   Substance Use Topics     Smoking status: Never Smoker     Smokeless tobacco: Never Used      Comment: no smokers in household     Alcohol use Yes            Physical Exam:   All vitals have been reviewed  Blood pressure (!) 131/96, temperature 98.1  F (36.7  C), temperature source Oral, resp. rate 14, height 1.6 m (5' 3\"), weight 84.4 kg (186 lb 1.6 oz), SpO2 97 %.  Airway assessment:   Patient is able to open mouth wide  Patient is able to stick out tongue      Lungs:   No increased work of breathing, good air exchange, clear to auscultation bilaterally, no crackles or wheezing      Cardiovascular:   Normal apical impulse, regular rate and rhythm, normal S1 and S2, no S3 or S4, and no murmur noted           Lab / Radiology Results:   All laboratory data reviewed          Assessment:   Appropriately NPO  Chief complaint or " anatomic assessment of involved area: anemia         Plan:   Moderate (conscious) sedation     Patient's active problems diagnostically and therapeutically optimized for the planned procedure  Risks, benefits, alternatives to sedation and blood explained and consent obtained  Risks, benefits, alternatives to procedure explained and consent obtained  Orders and progress notes are in the chart  Discharge from Phase 1 and / or Phase 2 recovery when patient meets criteria    I have reviewed the history and physical, lab finding(s), diagnostic data, medicaitons, and the plan for sedation.  I have determined this patient to be an appropriate candidate for the planned sedation / procedure and have reassessed the patient immediately prior to sedation / procedure.    I have personally and medically directed the administration of medications used.    Richard Gomez MD

## 2017-12-11 NOTE — TELEPHONE ENCOUNTER
Called   willl get 2 more hemoccults  Start iron  Kamala hgb in 3-4 weeks  Get tet imm  Follow bp's

## 2017-12-29 ENCOUNTER — TELEPHONE (OUTPATIENT)
Dept: FAMILY MEDICINE | Facility: OTHER | Age: 42
End: 2017-12-29

## 2017-12-29 NOTE — TELEPHONE ENCOUNTER
Chief Complaint   Patient presents with     Pt. Information/instruction     Call #1 Message left pls sched lab appt nonfasting for recheck hbg and bring in stool sample

## 2018-01-31 ENCOUNTER — TELEPHONE (OUTPATIENT)
Dept: FAMILY MEDICINE | Facility: CLINIC | Age: 43
End: 2018-01-31

## 2019-12-06 ENCOUNTER — OFFICE VISIT (OUTPATIENT)
Dept: FAMILY MEDICINE | Facility: CLINIC | Age: 44
End: 2019-12-06
Payer: COMMERCIAL

## 2019-12-06 VITALS
DIASTOLIC BLOOD PRESSURE: 80 MMHG | HEART RATE: 97 BPM | SYSTOLIC BLOOD PRESSURE: 124 MMHG | RESPIRATION RATE: 20 BRPM | OXYGEN SATURATION: 98 % | TEMPERATURE: 97 F | HEIGHT: 64 IN | BODY MASS INDEX: 30.54 KG/M2 | WEIGHT: 178.9 LBS

## 2019-12-06 DIAGNOSIS — Z23 NEED FOR PROPHYLACTIC VACCINATION AND INOCULATION AGAINST INFLUENZA: ICD-10-CM

## 2019-12-06 DIAGNOSIS — Z00.00 ROUTINE GENERAL MEDICAL EXAMINATION AT A HEALTH CARE FACILITY: Primary | ICD-10-CM

## 2019-12-06 DIAGNOSIS — Z13.6 CARDIOVASCULAR SCREENING; LDL GOAL LESS THAN 160: ICD-10-CM

## 2019-12-06 DIAGNOSIS — D50.9 IRON DEFICIENCY ANEMIA, UNSPECIFIED IRON DEFICIENCY ANEMIA TYPE: ICD-10-CM

## 2019-12-06 LAB
ALBUMIN SERPL-MCNC: 3.8 G/DL (ref 3.4–5)
ALP SERPL-CCNC: 94 U/L (ref 40–150)
ALT SERPL W P-5'-P-CCNC: 36 U/L (ref 0–70)
ANION GAP SERPL CALCULATED.3IONS-SCNC: 2 MMOL/L (ref 3–14)
AST SERPL W P-5'-P-CCNC: 35 U/L (ref 0–45)
BILIRUB SERPL-MCNC: 0.6 MG/DL (ref 0.2–1.3)
BUN SERPL-MCNC: 11 MG/DL (ref 7–30)
CALCIUM SERPL-MCNC: 8.9 MG/DL (ref 8.5–10.1)
CHLORIDE SERPL-SCNC: 107 MMOL/L (ref 94–109)
CHOLEST SERPL-MCNC: 163 MG/DL
CO2 SERPL-SCNC: 31 MMOL/L (ref 20–32)
CREAT SERPL-MCNC: 1.24 MG/DL (ref 0.66–1.25)
ERYTHROCYTE [DISTWIDTH] IN BLOOD BY AUTOMATED COUNT: 14.8 % (ref 10–15)
GFR SERPL CREATININE-BSD FRML MDRD: 69 ML/MIN/{1.73_M2}
GLUCOSE SERPL-MCNC: 95 MG/DL (ref 70–99)
HCT VFR BLD AUTO: 46.8 % (ref 40–53)
HDLC SERPL-MCNC: 64 MG/DL
HGB BLD-MCNC: 15.2 G/DL (ref 13.3–17.7)
LDLC SERPL CALC-MCNC: 74 MG/DL
MCH RBC QN AUTO: 27.7 PG (ref 26.5–33)
MCHC RBC AUTO-ENTMCNC: 32.5 G/DL (ref 31.5–36.5)
MCV RBC AUTO: 85 FL (ref 78–100)
NONHDLC SERPL-MCNC: 99 MG/DL
PLATELET # BLD AUTO: 262 10E9/L (ref 150–450)
POTASSIUM SERPL-SCNC: 4.2 MMOL/L (ref 3.4–5.3)
PROT SERPL-MCNC: 7.2 G/DL (ref 6.8–8.8)
RBC # BLD AUTO: 5.48 10E12/L (ref 4.4–5.9)
SODIUM SERPL-SCNC: 140 MMOL/L (ref 133–144)
TRIGL SERPL-MCNC: 126 MG/DL
WBC # BLD AUTO: 7.8 10E9/L (ref 4–11)

## 2019-12-06 PROCEDURE — 90472 IMMUNIZATION ADMIN EACH ADD: CPT | Performed by: FAMILY MEDICINE

## 2019-12-06 PROCEDURE — 85027 COMPLETE CBC AUTOMATED: CPT | Performed by: FAMILY MEDICINE

## 2019-12-06 PROCEDURE — 90715 TDAP VACCINE 7 YRS/> IM: CPT | Performed by: FAMILY MEDICINE

## 2019-12-06 PROCEDURE — 80061 LIPID PANEL: CPT | Performed by: FAMILY MEDICINE

## 2019-12-06 PROCEDURE — 80053 COMPREHEN METABOLIC PANEL: CPT | Performed by: FAMILY MEDICINE

## 2019-12-06 PROCEDURE — 99396 PREV VISIT EST AGE 40-64: CPT | Mod: 25 | Performed by: FAMILY MEDICINE

## 2019-12-06 PROCEDURE — 90686 IIV4 VACC NO PRSV 0.5 ML IM: CPT | Performed by: FAMILY MEDICINE

## 2019-12-06 PROCEDURE — 90471 IMMUNIZATION ADMIN: CPT | Performed by: FAMILY MEDICINE

## 2019-12-06 PROCEDURE — 36415 COLL VENOUS BLD VENIPUNCTURE: CPT | Performed by: FAMILY MEDICINE

## 2019-12-06 ASSESSMENT — ENCOUNTER SYMPTOMS
EYE PAIN: 0
FREQUENCY: 0
PALPITATIONS: 0
PARESTHESIAS: 0
DIARRHEA: 0
HEMATOCHEZIA: 0
JOINT SWELLING: 0
CHILLS: 0
HEARTBURN: 0
HEMATURIA: 0
SHORTNESS OF BREATH: 0
ABDOMINAL PAIN: 0
FEVER: 0
COUGH: 0
MYALGIAS: 0
NERVOUS/ANXIOUS: 0
SORE THROAT: 0
CONSTIPATION: 0
HEADACHES: 0
WEAKNESS: 0
ARTHRALGIAS: 0
NAUSEA: 0
DIZZINESS: 0
DYSURIA: 0

## 2019-12-06 ASSESSMENT — PAIN SCALES - GENERAL: PAINLEVEL: NO PAIN (0)

## 2019-12-06 ASSESSMENT — MIFFLIN-ST. JEOR: SCORE: 1602.49

## 2019-12-06 NOTE — LETTER
December 9, 2019      Beny Nelson  [address] [facility] [address]        Dear ,    We are writing to inform you of your test results.    Lab tests show your chemistry panel including blood sugar liver function kidney function tests were all normal.  Your cholesterol was very good at 163.  Your blood count and hemoglobin were normal.     Resulted Orders   CBC with platelets   Result Value Ref Range    WBC 7.8 4.0 - 11.0 10e9/L    RBC Count 5.48 4.4 - 5.9 10e12/L    Hemoglobin 15.2 13.3 - 17.7 g/dL    Hematocrit 46.8 40.0 - 53.0 %    MCV 85 78 - 100 fl    MCH 27.7 26.5 - 33.0 pg    MCHC 32.5 31.5 - 36.5 g/dL    RDW 14.8 10.0 - 15.0 %    Platelet Count 262 150 - 450 10e9/L   Comprehensive metabolic panel   Result Value Ref Range    Sodium 140 133 - 144 mmol/L    Potassium 4.2 3.4 - 5.3 mmol/L    Chloride 107 94 - 109 mmol/L    Carbon Dioxide 31 20 - 32 mmol/L    Anion Gap 2 (L) 3 - 14 mmol/L    Glucose 95 70 - 99 mg/dL      Comment:      Fasting specimen    Urea Nitrogen 11 7 - 30 mg/dL    Creatinine 1.24 0.66 - 1.25 mg/dL    GFR Estimate 69 >60 mL/min/[1.73_m2]      Comment:      Non  GFR Calc  Starting 12/18/2018, serum creatinine based estimated GFR (eGFR) will be   calculated using the Chronic Kidney Disease Epidemiology Collaboration   (CKD-EPI) equation.      GFR Estimate If Black 80 >60 mL/min/[1.73_m2]      Comment:       GFR Calc  Starting 12/18/2018, serum creatinine based estimated GFR (eGFR) will be   calculated using the Chronic Kidney Disease Epidemiology Collaboration   (CKD-EPI) equation.      Calcium 8.9 8.5 - 10.1 mg/dL    Bilirubin Total 0.6 0.2 - 1.3 mg/dL    Albumin 3.8 3.4 - 5.0 g/dL    Protein Total 7.2 6.8 - 8.8 g/dL    Alkaline Phosphatase 94 40 - 150 U/L    ALT 36 0 - 70 U/L    AST 35 0 - 45 U/L   Lipid Profile   Result Value Ref Range    Cholesterol 163 <200 mg/dL    Triglycerides 126 <150 mg/dL      Comment:      Fasting specimen    HDL  Cholesterol 64 >39 mg/dL    LDL Cholesterol Calculated 74 <100 mg/dL      Comment:      Desirable:       <100 mg/dl    Non HDL Cholesterol 99 <130 mg/dL       If you have any questions or concerns, please call the clinic at the number listed above.       Sincerely,        Richard Cortes MD

## 2019-12-06 NOTE — PROGRESS NOTES
SUBJECTIVE:   CC: Beny Nelson is an 44 year old male who presents for preventative health visit.     Healthy Habits:     Getting at least 3 servings of Calcium per day:  Yes    Bi-annual eye exam:  NO    Dental care twice a year:  Yes    Sleep apnea or symptoms of sleep apnea:  Daytime drowsiness    Diet:  Regular (no restrictions)    Frequency of exercise:  2-3 days/week    Duration of exercise:  30-45 minutes    Taking medications regularly:  Yes    Medication side effects:  None    PHQ-2 Total Score: 0    Additional concerns today:  No              Today's PHQ-2 Score:   PHQ-2 ( 1999 Pf[employer]) 12/6/2019   Q1: Little interest or pleasure in doing things 0   Q2: Feeling down, depressed or hopeless 0   PHQ-2 Score 0   Q1: Little interest or pleasure in doing things Not at all   Q2: Feeling down, depressed or hopeless Not at all   PHQ-2 Score 0       Abuse: Current or Past(Physical, Sexual or Emotional)- No  Do you feel safe in your environment? Yes        Social History     Tobacco Use     Smoking status: Never Smoker     Smokeless tobacco: Never Used     Tobacco comment: no smokers in household   Substance Use Topics     Alcohol use: Yes     If you drink alcohol do you typically have >3 drinks per day or >7 drinks per week? No    Alcohol Use 12/6/2019   Prescreen: >3 drinks/day or >7 drinks/week? Yes   Prescreen: >3 drinks/day or >7 drinks/week? -   AUDIT SCORE  15       Last PSA:   PSA   Date Value Ref Range Status   11/22/2017 1.03 0 - 4 ug/L Final     Comment:     Assay Method:  Chemiluminescence using Siemens Vista analyzer       Reviewed orders with patient. Reviewed health maintenance and updated orders accordingly - Yes      Reviewed and updated as needed this visit by clinical staff         Reviewed and updated as needed this visit by Provider        Past Medical History:   Diagnosis Date     Bariatric surgery status       Past Surgical History:   Procedure Laterality Date     COLONOSCOPY N/A 12/8/2017     Procedure: COLONOSCOPY;  Colonoscopy, Combined Esophagoscopy, Gastroscopy, Duodenoscopy;  Surgeon: Richard Gomez MD;  Location:  GI     ESOPHAGOSCOPY, GASTROSCOPY, DUODENOSCOPY (EGD), COMBINED N/A 12/8/2017    Procedure: COMBINED ESOPHAGOSCOPY, GASTROSCOPY, DUODENOSCOPY (EGD);;  Surgeon: Richard Gomez MD;  Location:  GI     GASTRIC BYPASS  2005       Review of Systems   Constitutional: Negative for chills and fever.   HENT: Negative for congestion, ear pain, hearing loss and sore throat.    Eyes: Negative for pain and visual disturbance.   Respiratory: Negative for cough and shortness of breath.    Cardiovascular: Negative for chest pain, palpitations and peripheral edema.   Gastrointestinal: Negative for abdominal pain, constipation, diarrhea, heartburn, hematochezia and nausea.   Genitourinary: Positive for impotence. Negative for discharge, dysuria, frequency, genital sores, hematuria and urgency.   Musculoskeletal: Negative for arthralgias, joint swelling and myalgias.   Skin: Negative for rash.   Neurological: Negative for dizziness, weakness, headaches and paresthesias.   Psychiatric/Behavioral: Negative for mood changes. The patient is not nervous/anxious.          OBJECTIVE:   There were no vitals taken for this visit.    Physical Exam  GENERAL: healthy, alert and no distress  EYES: Eyes grossly normal to inspection, PERRL and conjunctivae and sclerae normal  HENT: ear canals and TM's normal, nose and mouth without ulcers or lesions  NECK: no adenopathy, no asymmetry, masses, or scars and thyroid normal to palpation  RESP: lungs clear to auscultation - no rales, rhonchi or wheezes  CV: regular rate and rhythm, normal S1 S2, no S3 or S4, no murmur, click or rub, no peripheral edema and peripheral pulses strong  ABDOMEN: soft, nontender, no hepatosplenomegaly, no masses and bowel sounds normal  MS: no gross musculoskeletal defects noted, no edema  SKIN: no suspicious lesions or rashes and  multiple tattoos  NEURO: Normal strength and tone, mentation intact and speech normal  PSYCH: mentation appears normal, affect normal/bright    Diagnostic Test Results:  Labs reviewed in Epic  Results for orders placed or performed in visit on 12/06/19   CBC with platelets     Status: None   Result Value Ref Range    WBC 7.8 4.0 - 11.0 10e9/L    RBC Count 5.48 4.4 - 5.9 10e12/L    Hemoglobin 15.2 13.3 - 17.7 g/dL    Hematocrit 46.8 40.0 - 53.0 %    MCV 85 78 - 100 fl    MCH 27.7 26.5 - 33.0 pg    MCHC 32.5 31.5 - 36.5 g/dL    RDW 14.8 10.0 - 15.0 %    Platelet Count 262 150 - 450 10e9/L   Comprehensive metabolic panel     Status: Abnormal   Result Value Ref Range    Sodium 140 133 - 144 mmol/L    Potassium 4.2 3.4 - 5.3 mmol/L    Chloride 107 94 - 109 mmol/L    Carbon Dioxide 31 20 - 32 mmol/L    Anion Gap 2 (L) 3 - 14 mmol/L    Glucose 95 70 - 99 mg/dL    Urea Nitrogen 11 7 - 30 mg/dL    Creatinine 1.24 0.66 - 1.25 mg/dL    GFR Estimate 69 >60 mL/min/[1.73_m2]    GFR Estimate If Black 80 >60 mL/min/[1.73_m2]    Calcium 8.9 8.5 - 10.1 mg/dL    Bilirubin Total 0.6 0.2 - 1.3 mg/dL    Albumin 3.8 3.4 - 5.0 g/dL    Protein Total 7.2 6.8 - 8.8 g/dL    Alkaline Phosphatase 94 40 - 150 U/L    ALT 36 0 - 70 U/L    AST 35 0 - 45 U/L   Lipid Profile     Status: None   Result Value Ref Range    Cholesterol 163 <200 mg/dL    Triglycerides 126 <150 mg/dL    HDL Cholesterol 64 >39 mg/dL    LDL Cholesterol Calculated 74 <100 mg/dL    Non HDL Cholesterol 99 <130 mg/dL       ASSESSMENT/PLAN:   1. Routine general medical examination at a health care facility  Generally healthy does a lot of weight lifting.  Is an  in a law firm.  Had some concerns about insomnia he is going to try melatonin.  Down the line could try some Ambien.  - TDAP VACCINE  - VACCINE ADMINISTRATION, EACH ADDITIONAL    2. CARDIOVASCULAR SCREENING; LDL GOAL LESS THAN 160  Notify with results.  - Comprehensive metabolic panel  - Lipid Profile    3. Iron  "deficiency anemia, unspecified iron deficiency anemia type  Notify with results.  - CBC with platelets    4. Need for prophylactic vaccination and inoculation against influenza    - INFLUENZA VACCINE IM > 6 MONTHS VALENT IIV4 [68932]  - Vaccine Administration, Initial [78334]    COUNSELING:   Reviewed preventive health counseling, as reflected in patient instructions       Regular exercise       Healthy diet/nutrition       Vision screening    Estimated body mass index is 32.97 kg/m  as calculated from the following:    Height as of 12/8/17: 1.6 m (5' 3\").    Weight as of 12/8/17: 84.4 kg (186 lb 1.6 oz).     Weight management plan: Discussed healthy diet and exercise guidelines     reports that he has never smoked. He has never used smokeless tobacco.      Counseling Resources:  ATP IV Guidelines  Pooled Cohorts Equation Calculator  FRAX Risk Assessment  ICSI Preventive Guidelines  Dietary Guidelines for Americans, 2010  [employer]'s MyPlate  ASA Prophylaxis  Lung CA Screening    Richard Cortes MD    Prior to immunization administration, verified patients identity using patient s name and date of birth. Please see Immunization Activity for additional information.     Screening Questionnaire for Adult Immunization    Are you sick today?   No   Do you have allergies to medications, food, a vaccine component or latex?   No   Have you ever had a serious reaction after receiving a vaccination?   No   Do you have a long-term health problem with heart disease, lung disease, asthma, kidney disease, metabolic disease (e.g. diabetes), anemia, or other blood disorder?   No   Do you have cancer, leukemia, HIV/AIDS, or any other immune system problem?   No   In the past 3 months, have you taken medications that affect  your immune system, such as prednisone, other steroids, or anticancer drugs; drugs for the treatment of rheumatoid arthritis, Crohn s disease, or psoriasis; or have you had radiation treatments?   No   Have you had " a seizure, or a brain or other nervous system problem?   No   During the past year, have you received a transfusion of blood or blood     products, or been given immune (gamma) globulin or antiviral drug?   No   For women: Are you pregnant or is there a chance you could become        pregnant during the next month?   No   Have you received any vaccinations in the past 4 weeks?   No     Immunization questionnaire answers were all negative.        Per orders of Dr. Cortes, injection of Tdap given by Pearl Hogan MA. Patient instructed to remain in clinic for 15 minutes afterwards, and to report any adverse reaction to me immediately.       Screening performed by Pearl Hogan MA on 12/6/2019 at 9:13 AM.  Verified patient's name and date of birth to confirm prior to injection. Instructed patient to remain in clinic 20 minutes following injection, in case allergic reaction occurs seek medical staff. A Case MA    [facility]

## 2019-12-09 NOTE — RESULT ENCOUNTER NOTE
Lab tests show your chemistry panel including blood sugar liver function kidney function tests were all normal.  Your cholesterol was very good at 163.  Your blood count and hemoglobin were normal.

## 2019-12-19 DIAGNOSIS — G47.00 INSOMNIA, UNSPECIFIED TYPE: Primary | ICD-10-CM

## 2019-12-19 RX ORDER — ZOLPIDEM TARTRATE 10 MG/1
TABLET ORAL
Qty: 30 TABLET | Refills: 0 | Status: SHIPPED | OUTPATIENT
Start: 2019-12-19 | End: 2020-03-02

## 2019-12-19 NOTE — TELEPHONE ENCOUNTER
Per Dr. Cortes patient can have Ambien 10 mg 1/2-1 tab at bedtime as needed. (not every night use sparingly).    Attempted to call patient, no answer. Full VM.     Idalia Cadet on 12/19/2019 at 3:30 PM

## 2019-12-19 NOTE — TELEPHONE ENCOUNTER
Reason for Call:  Medication or medication refill:    Do you use a [facility]?  Name of the pharmacy and phone number for the current request:  [facility] - [phone]    Name of the medication requested: ambien    Other request: Pt was in to see  a week or two ago.  Pt states it was noted in his account that he was able to prescribe Ambien to the patient if the things he was currently using weren't working.  Pt would like to go ahead and have this called into the [facility].     Can we leave a detailed message on this number? YES    Phone number patient can be reached at: Home number on file [phone] (home)    Best Time: anytime     Call taken on 12/19/2019 at 3:02 PM by Mary Cortes

## 2020-01-27 ENCOUNTER — OFFICE VISIT (OUTPATIENT)
Dept: FAMILY MEDICINE | Facility: CLINIC | Age: 45
End: 2020-01-27
Payer: COMMERCIAL

## 2020-01-27 VITALS
BODY MASS INDEX: 30.14 KG/M2 | OXYGEN SATURATION: 96 % | SYSTOLIC BLOOD PRESSURE: 140 MMHG | RESPIRATION RATE: 16 BRPM | WEIGHT: 180.9 LBS | HEART RATE: 86 BPM | HEIGHT: 65 IN | DIASTOLIC BLOOD PRESSURE: 90 MMHG | TEMPERATURE: 99.1 F

## 2020-01-27 DIAGNOSIS — I10 BENIGN ESSENTIAL HYPERTENSION: Primary | ICD-10-CM

## 2020-01-27 DIAGNOSIS — R19.7 ACUTE DIARRHEA: ICD-10-CM

## 2020-01-27 PROCEDURE — 99214 OFFICE O/P EST MOD 30 MIN: CPT | Performed by: NURSE PRACTITIONER

## 2020-01-27 RX ORDER — LISINOPRIL 10 MG/1
10 TABLET ORAL DAILY
Qty: 30 TABLET | Refills: 0 | Status: SHIPPED | OUTPATIENT
Start: 2020-01-27 | End: 2020-02-27

## 2020-01-27 RX ORDER — LEVOFLOXACIN 500 MG/1
500 TABLET, FILM COATED ORAL DAILY
Qty: 5 TABLET | Refills: 0 | Status: SHIPPED | OUTPATIENT
Start: 2020-01-27 | End: 2020-02-01

## 2020-01-27 ASSESSMENT — MIFFLIN-ST. JEOR: SCORE: 1628.37

## 2020-01-27 NOTE — PROGRESS NOTES
"Subjective     Beny Nelson is a 44 year old male who presents to clinic today for the following health issues:    HPI   Diarrhea  Onset: about 1.5 weeks ago    Description:   Consistency of stool: watery and loose  Blood in stool: no   Number of loose stools in past 24 hours: 6    Progression of Symptoms:  worsening    Accompanying Signs & Symptoms:  Fever: no   Nausea or vomiting; no   Abdominal pain: YES- when have to have BM  Episodes of constipation: no   Weight loss: no     History:   Ill contacts: no   Recent use of antibiotics: YES- about a month ago had penicillin    Recent travels: no          Recent medication-new or changes(Rx or OTC): no     Precipitating factors:   eating    Alleviating factors:    imodium helps relieve diarrhea      Therapies Tried and outcome:  Imodium AD; Outcome: helps    Patient states he has episodes of this the past few years, he is unsure if it is related to his gastric bypass.   - diarrhea x 10 days   - no blood in stool  - Worse over 10 days  - No fever, headache, nausea  - No travel   - No new meds   - No new foods   - Gastric bypass - seems to have had episodes since   - Happens every year around this time   - Colonoscopy 2017  - reports he has had stool studies done in the past and all came back negative  - also reports receiving a \"steroid\" in the past for diarrhea  -no headache, shortness of breath, chest pain with elevated BP reading   - has not taken medication for HTN in many years due to previous BP meds making him gain weight     Patient Active Problem List   Diagnosis     Bariatric surgery status     Wrist tendonitis     CARDIOVASCULAR SCREENING; LDL GOAL LESS THAN 160     Erectile dysfunction     Onychomycosis     Iron deficiency anemia, unspecified iron deficiency anemia type     Benign essential hypertension     Past Surgical History:   Procedure Laterality Date     COLONOSCOPY N/A 12/8/2017    Procedure: COLONOSCOPY;  Colonoscopy, Combined Esophagoscopy, " Gastroscopy, Duodenoscopy;  Surgeon: Richard Gomez MD;  Location: [facility]     ESOPHAGOSCOPY, GASTROSCOPY, DUODENOSCOPY (EGD), COMBINED N/A 12/8/2017    Procedure: COMBINED ESOPHAGOSCOPY, GASTROSCOPY, DUODENOSCOPY (EGD);;  Surgeon: Richard Gomez MD;  Location: [facility]     GASTRIC BYPASS  2005       Social History     Tobacco Use     Smoking status: Never Smoker     Smokeless tobacco: Never Used     Tobacco comment: no smokers in household   Substance Use Topics     Alcohol use: Yes     Family History   Problem Relation Age of Onset     Diabetes Father      Cancer Paternal Grandfather          Current Outpatient Medications   Medication Sig Dispense Refill     Acetaminophen (TYLENOL PO)        ibuprofen (ADVIL) 200 MG tablet Take 200 mg by mouth every 4 hours as needed.       levofloxacin (LEVAQUIN) 500 MG tablet Take 1 tablet (500 mg) by mouth daily for 5 days 5 tablet 0     lisinopril (PRINIVIL/ZESTRIL) 10 MG tablet Take 1 tablet (10 mg) by mouth daily 30 tablet 0     VIAGRA 100 MG tablet TAKE 1/2 TABLET BY MOUTH ONCE A DAY AS NEEDED - TAKE AS DIRECTED. 10 tablet 5     zolpidem (AMBIEN) 10 MG tablet Take 1/2 to 1 full tab at bedtime PRN. 30 tablet 0     No Known Allergies  Recent Labs   Lab Test 12/06/19  0911 11/22/17  1847 04/13/13  1026   LDL 74 60 45   HDL 64 68 56   TRIG 126 176* 144   ALT 36 36  --    CR 1.24 1.03 1.07   GFRESTIMATED 69 78 77   GFRESTBLACK 80 >90 >90   POTASSIUM 4.2 3.8 4.4   TSH  --   --  1.86      BP Readings from Last 3 Encounters:   01/27/20 (!) 140/90   12/06/19 124/80   12/08/17 135/84    Wt Readings from Last 3 Encounters:   01/27/20 82.1 kg (180 lb 14.4 oz)   12/06/19 81.1 kg (178 lb 14.4 oz)   12/08/17 84.4 kg (186 lb 1.6 oz)                    Reviewed and updated as needed this visit by Provider  Tobacco  Allergies  Meds  Problems  Med Hx  Surg Hx  Fam Hx         Review of Systems   ROS COMP: Constitutional, HEENT, cardiovascular, pulmonary, gi and gu systems are  "negative, except as otherwise noted.      Objective    BP (!) 140/90   Pulse 86   Temp 99.1  F (37.3  C) (Temporal)   Resp 16   Ht 1.653 m (5' 5.06\")   Wt 82.1 kg (180 lb 14.4 oz)   SpO2 96%   BMI 30.05 kg/m    Body mass index is 30.05 kg/m .  Physical Exam   GENERAL: healthy, alert and no distress  RESP: lungs clear to auscultation - no rales, rhonchi or wheezes  CV: regular rate and rhythm, normal S1 S2, no S3 or S4, no murmur, click or rub, no peripheral edema and peripheral pulses strong  ABDOMEN: soft, nontender, no hepatosplenomegaly, no masses and bowel sounds normal  MS: no gross musculoskeletal defects noted, no edema  SKIN: no suspicious lesions or rashes  NEURO: Normal strength and tone, mentation intact and speech normal  PSYCH: mentation appears normal, affect normal/bright    Diagnostic Test Results:  Labs reviewed in Epic  none         Assessment & Plan     1. Benign essential hypertension  I had a long discussion with Kimberlyn today regarding his blood pressure elevated readings today in office.  He reports having a \"20-year history\" of high blood pressures, but quit taking medication years ago due to weight gain.  He cannot recall what he took at the time.  BP on arrival 160/102, and at the end of visit, 140/90.  He denies chest pain, shortness of breath, or other acute distress.  After discussing risks of long-term HTN, he decided on a trial of Lisinopril.  I encouraged close follow-up with his PCP and only prescribed 30 days worth so he would be more compliant.  Advised to follow-up for symptoms that are not improving or becoming worse.  Patient voiced understanding and agreement with treatment plan.    - lisinopril (PRINIVIL/ZESTRIL) 10 MG tablet; Take 1 tablet (10 mg) by mouth daily  Dispense: 30 tablet; Refill: 0    2. Acute diarrhea  In chart review and in care everywhere, I discussed with Beny that I did not see where stool tests have been completed in the past.  He was very frustrated " with this.  There was also no medication (steroid) prescribed for him with any visits over the last few years.  When I told him I wanted to order stool testing (ie. C-Diff, enteric bacteria, O&P, leukocytes), he refused today.  He states he wants to see if he can find studies done in his own records.  He requested an antibiotic, and I educated him that this is not a first-line recommendation.  Per Up-to-Date, appropriate fluoroquinolones include ciprofloxacin (a single 750 mg dose or 500 mg twice daily for three to five days) and levofloxacin (500 mg as a single dose or given once daily for three to five days). Prescribed the following.  Home care measures discussed in detail and these were included in his AVS.  Advised to follow-up for symptoms that are not improving or becoming worse.  Patient voiced understanding and agreement with treatment plan.    - levofloxacin (LEVAQUIN) 500 MG tablet; Take 1 tablet (500 mg) by mouth daily for 5 days  Dispense: 5 tablet; Refill: 0       Patient Instructions   Please call or return to clinic for any questions, concerns, or symptoms that are not improving or becoming worse.    Kelsi Chino, CNP    Patient Education     Diarrhea with Uncertain Cause (Adult)    Diarrhea is when stools are loose and watery. This can be caused by:    Viral infections    Bacterial infections    Food poisoning    Parasites    Irritable bowel syndrome (IBS)    Inflammatory bowel diseases such as ulcerative colitis, Crohn's disease, and celiac disease    Food intolerance, such as to lactose, the sugar found in milk and milk products    Reaction to medicines like antibiotics, laxatives, cancer drugs, and antacids  Along with diarrhea, you may also have:    Abdominal pain and cramping    Nausea and vomiting    Loss of bowel control    Fever and chills    Bloody stools  In some cases, antibiotics may help to treat diarrhea. You may have a stool sample test. This is done to see what is causing your  diarrhea, and if antibiotics will help treat it. The results of a stool sample test may take up to 2 days. The healthcare provider may not give you antibiotics until he or she has the stool test results.  Diarrhea can cause dehydration. This is the loss of too much water and other fluids from the body. When this occurs, body fluid must be replaced. This can be done with oral rehydration solutions. Oral rehydration solutions are available at drugstores and grocery stores without a prescription. Sports drinks are not the best choice if you are very dehydrated. They have too much sugar and not enough electrolytes.  Home care  Follow all instructions given by your healthcare provider. Rest at home for the next 24 hours, or until you feel better. Avoid caffeine, tobacco, and alcohol. These can make diarrhea, cramping, and pain worse.  If taking medicines:    Over-the-counter nausea and diarrhea medicines are generally OK unless you experience fever or blood stool. Check with your doctor first in those circumstances.    You may use acetaminophen or NSAID medicines like ibuprofen or naproxen to reduce pain and fever. Don t use these if you have chronic liver or kidney disease, or ever had a stomach ulcer or gastrointestinal bleeding. Don't use NSAID medicines if you are already taking one for another condition (like arthritis) or are on daily aspirin therapy (such as for heart disease or after a stroke). Talk with your healthcare provider first.    If antibiotics were prescribed, be sure you take them until they are finished. Don t stop taking them even when you feel better. Antibiotics must be taken as a full course.  To prevent the spread of illness:    Remember that washing with soap and water and using alcohol-based  is the best way to prevent the spread of infection. Dry your hands with a single use towel (like a paper towel).    Clean the toilet after each use.    Wash your hands before eating.    Wash your  hands before and after preparing food. Keep in mind that people with diarrhea or vomiting should not prepare food for others.    Wash your hands after using cutting boards, countertops, and knives that have been in contact with raw foods.    Wash and then peel fruits and vegetables.    Keep uncooked meats away from cooked and ready-to-eat foods.    Use a food thermometer when cooking. Cook poultry to at least 165 F (74 C). Cook ground meat (beef, veal, pork, lamb) to at least 160 F (71 C). Cook fresh beef, veal, lamb, and pork to at least 145 F (63 C).    Don t eat raw or undercooked eggs (poached or eileen side up), poultry, meat, or unpasteurized milk and juices.  Food and drinks  The main goal while treating vomiting or diarrhea is to prevent dehydration. This is done by taking small amounts of liquids often.    Keep in mind that liquids are more important than food right now.    Drink only small amounts of liquids at a time.    Don t force yourself to eat, especially if you are having cramping, vomiting, or diarrhea. Don t eat large amounts at a time, even if you are hungry.    If you eat, avoid fatty, greasy, spicy, or fried foods.    Don t eat dairy foods or drink milk if you have diarrhea. These can make diarrhea worse.  During the first 24 hours you can try:    Oral rehydration solutions.  Sports drinks may be used if you are not too dehydrated and are otherwise healthy.    Soft drinks without caffeine    Ginger ale    Water (plain or flavored)    Decaf tea or coffee    Clear broth, consommé, or bouillon    Gelatin, popsicles, or frozen fruit juice bars  The second 24 hours, if you are feeling better, you can add:    Hot cereal, plain toast, bread, rolls, or crackers    Plain noodles, rice, mashed potatoes, chicken noodle soup, or rice soup    Unsweetened canned fruit (no pineapple)    Bananas  As you recover:    Limit fat intake to less than 15 grams per day. Don t eat margarine, butter, oils, mayonnaise,  sauces, gravies, fried foods, peanut butter, meat, poultry, or fish.    Limit fiber. Don t eat raw or cooked vegetables, fresh fruits except bananas, or bran cereals.    Limit caffeine and chocolate.    Limit dairy.    Don t use spices or seasonings except salt.    Go back to your normal diet over time, as you feel better and your symptoms improve.    If the symptoms come back, go back to a simple diet or clear liquids.  Follow-up care  Follow up with your healthcare provider, or as advised. If a stool sample was taken or cultures were done, call the healthcare provider for the results as instructed.  Call 911  Call 911 if you have any of these symptoms:    Trouble breathing    Confusion    Extreme drowsiness or trouble walking    Loss of consciousness    Rapid heart rate    Chest pain    Stiff neck    Seizure  When to seek medical advice  Call your healthcare provider right away if any of these occur:    Abdominal pain that gets worse    Constant lower right abdominal pain    Continued vomiting and inability to keep liquids down    Diarrhea more than 5 times a day    Blood in vomit or stool    Dark urine or no urine for 8 hours, dry mouth and tongue, tiredness, weakness, or dizziness    Drowsiness    New rash    You don t get better in 2 to 3 days    Fever of 100.4 F (38 C) or higher, or as directed by your healthcare provider  Date Last Reviewed: 6/1/2018 2000-2019 [employer]. [address] [place] [address], [place] [address]. All rights reserved. This information is not intended as a substitute for professional medical care. Always follow your healthcare professional's instructions.           Patient Education     Low-Salt Choices  Eating salt (sodium) can make your body retain too much water. Excess water makes your heart work harder. Canned, packaged, and frozen foods are easy to prepare. But they are often high in sodium. Here are some ideas for low-salt foods you can easily make yourself.    For  breakfast    Fruit or 100% fruit juice    Whole-wheat bread or an English muffin. Look for sodium content on Nutrition Facts labels.    Low-fat milk or yogurt    Unsalted eggs    Shredded wheat    Corn tortillas    Unsalted steamed rice    Regular (not instant) hot cereal, made without salt  Stay away from:    Sausage, david, and ham    Flour tortillas    Packaged muffins, pancakes, and biscuits    Instant hot cereals    Cottage cheese    For lunch and dinner    Fresh fish, chicken, turkey, or meat--baked, broiled, or roasted without salt    Dry beans, cooked without salt    Tofu, stir-fried without salt    Unsalted fresh fruit and vegetables, or frozen or canned fruit and vegetables with no added salt  Stay away from:    Lunch or deli meat that is cured or smoked    Cheese    Tomato juice and ketchup    Canned vegetables, soups, and fish not labeled as no-salt-added or reduced sodium    Packaged gravies and sauces    Olives, pickles, and relish    Bottled salad dressings    For snacks and desserts    Yogurt    Unsalted, air-popped popcorn    Unsalted nuts or seeds  Stay away from:    Pies and cakes    Packaged dessert mixes    Pizza    Canned and packaged puddings    Pretzels, chips, crackers, and nuts--unless the label says unsalted    Date Last Reviewed: 6/1/2017 2000-2019 [employer]. [address] [place] [address], [place] [address]. All rights reserved. This information is not intended as a substitute for professional medical care. Always follow your healthcare professional's instructions.               Return in about 3 days (around 1/30/2020).    Kelsi Chino, [facility]

## 2020-01-28 NOTE — PATIENT INSTRUCTIONS
Please call or return to clinic for any questions, concerns, or symptoms that are not improving or becoming worse.    Kelsi Chino, CNP    Patient Education     Diarrhea with Uncertain Cause (Adult)    Diarrhea is when stools are loose and watery. This can be caused by:    Viral infections    Bacterial infections    Food poisoning    Parasites    Irritable bowel syndrome (IBS)    Inflammatory bowel diseases such as ulcerative colitis, Crohn's disease, and celiac disease    Food intolerance, such as to lactose, the sugar found in milk and milk products    Reaction to medicines like antibiotics, laxatives, cancer drugs, and antacids  Along with diarrhea, you may also have:    Abdominal pain and cramping    Nausea and vomiting    Loss of bowel control    Fever and chills    Bloody stools  In some cases, antibiotics may help to treat diarrhea. You may have a stool sample test. This is done to see what is causing your diarrhea, and if antibiotics will help treat it. The results of a stool sample test may take up to 2 days. The healthcare provider may not give you antibiotics until he or she has the stool test results.  Diarrhea can cause dehydration. This is the loss of too much water and other fluids from the body. When this occurs, body fluid must be replaced. This can be done with oral rehydration solutions. Oral rehydration solutions are available at drugstores and grocery stores without a prescription. Sports drinks are not the best choice if you are very dehydrated. They have too much sugar and not enough electrolytes.  Home care  Follow all instructions given by your healthcare provider. Rest at home for the next 24 hours, or until you feel better. Avoid caffeine, tobacco, and alcohol. These can make diarrhea, cramping, and pain worse.  If taking medicines:    Over-the-counter nausea and diarrhea medicines are generally OK unless you experience fever or blood stool. Check with your doctor first in those  circumstances.    You may use acetaminophen or NSAID medicines like ibuprofen or naproxen to reduce pain and fever. Don t use these if you have chronic liver or kidney disease, or ever had a stomach ulcer or gastrointestinal bleeding. Don't use NSAID medicines if you are already taking one for another condition (like arthritis) or are on daily aspirin therapy (such as for heart disease or after a stroke). Talk with your healthcare provider first.    If antibiotics were prescribed, be sure you take them until they are finished. Don t stop taking them even when you feel better. Antibiotics must be taken as a full course.  To prevent the spread of illness:    Remember that washing with soap and water and using alcohol-based  is the best way to prevent the spread of infection. Dry your hands with a single use towel (like a paper towel).    Clean the toilet after each use.    Wash your hands before eating.    Wash your hands before and after preparing food. Keep in mind that people with diarrhea or vomiting should not prepare food for others.    Wash your hands after using cutting boards, countertops, and knives that have been in contact with raw foods.    Wash and then peel fruits and vegetables.    Keep uncooked meats away from cooked and ready-to-eat foods.    Use a food thermometer when cooking. Cook poultry to at least 165 F (74 C). Cook ground meat (beef, veal, pork, lamb) to at least 160 F (71 C). Cook fresh beef, veal, lamb, and pork to at least 145 F (63 C).    Don t eat raw or undercooked eggs (poached or eileen side up), poultry, meat, or unpasteurized milk and juices.  Food and drinks  The main goal while treating vomiting or diarrhea is to prevent dehydration. This is done by taking small amounts of liquids often.    Keep in mind that liquids are more important than food right now.    Drink only small amounts of liquids at a time.    Don t force yourself to eat, especially if you are having cramping,  vomiting, or diarrhea. Don t eat large amounts at a time, even if you are hungry.    If you eat, avoid fatty, greasy, spicy, or fried foods.    Don t eat dairy foods or drink milk if you have diarrhea. These can make diarrhea worse.  During the first 24 hours you can try:    Oral rehydration solutions.  Sports drinks may be used if you are not too dehydrated and are otherwise healthy.    Soft drinks without caffeine    Ginger ale    Water (plain or flavored)    Decaf tea or coffee    Clear broth, consommé, or bouillon    Gelatin, popsicles, or frozen fruit juice bars  The second 24 hours, if you are feeling better, you can add:    Hot cereal, plain toast, bread, rolls, or crackers    Plain noodles, rice, mashed potatoes, chicken noodle soup, or rice soup    Unsweetened canned fruit (no pineapple)    Bananas  As you recover:    Limit fat intake to less than 15 grams per day. Don t eat margarine, butter, oils, mayonnaise, sauces, gravies, fried foods, peanut butter, meat, poultry, or fish.    Limit fiber. Don t eat raw or cooked vegetables, fresh fruits except bananas, or bran cereals.    Limit caffeine and chocolate.    Limit dairy.    Don t use spices or seasonings except salt.    Go back to your normal diet over time, as you feel better and your symptoms improve.    If the symptoms come back, go back to a simple diet or clear liquids.  Follow-up care  Follow up with your healthcare provider, or as advised. If a stool sample was taken or cultures were done, call the healthcare provider for the results as instructed.  Call 911  Call 911 if you have any of these symptoms:    Trouble breathing    Confusion    Extreme drowsiness or trouble walking    Loss of consciousness    Rapid heart rate    Chest pain    Stiff neck    Seizure  When to seek medical advice  Call your healthcare provider right away if any of these occur:    Abdominal pain that gets worse    Constant lower right abdominal pain    Continued vomiting and  inability to keep liquids down    Diarrhea more than 5 times a day    Blood in vomit or stool    Dark urine or no urine for 8 hours, dry mouth and tongue, tiredness, weakness, or dizziness    Drowsiness    New rash    You don t get better in 2 to 3 days    Fever of 100.4 F (38 C) or higher, or as directed by your healthcare provider  Date Last Reviewed: 6/1/2018 2000-2019 [employer]. [address] [place] [address], [place] [address]. All rights reserved. This information is not intended as a substitute for professional medical care. Always follow your healthcare professional's instructions.           Patient Education     Low-Salt Choices  Eating salt (sodium) can make your body retain too much water. Excess water makes your heart work harder. Canned, packaged, and frozen foods are easy to prepare. But they are often high in sodium. Here are some ideas for low-salt foods you can easily make yourself.    For breakfast    Fruit or 100% fruit juice    Whole-wheat bread or an English muffin. Look for sodium content on Nutrition Facts labels.    Low-fat milk or yogurt    Unsalted eggs    Shredded wheat    Corn tortillas    Unsalted steamed rice    Regular (not instant) hot cereal, made without salt  Stay away from:    Sausage, david, and ham    Flour tortillas    Packaged muffins, pancakes, and biscuits    Instant hot cereals    Cottage cheese    For lunch and dinner    Fresh fish, chicken, turkey, or meat--baked, broiled, or roasted without salt    Dry beans, cooked without salt    Tofu, stir-fried without salt    Unsalted fresh fruit and vegetables, or frozen or canned fruit and vegetables with no added salt  Stay away from:    Lunch or deli meat that is cured or smoked    Cheese    Tomato juice and ketchup    Canned vegetables, soups, and fish not labeled as no-salt-added or reduced sodium    Packaged gravies and sauces    Olives, pickles, and relish    Bottled salad dressings    For snacks and  desserts    Yogurt    Unsalted, air-popped popcorn    Unsalted nuts or seeds  Stay away from:    Pies and cakes    Packaged dessert mixes    Pizza    Canned and packaged puddings    Pretzels, chips, crackers, and nuts--unless the label says unsalted    Date Last Reviewed: 6/1/2017 2000-2019 [employer] [facility]. [address] [place] [address], [place] [address]. All rights reserved. This information is not intended as a substitute for professional medical care. Always follow your healthcare professional's instructions.

## 2020-01-29 PROBLEM — I10 BENIGN ESSENTIAL HYPERTENSION: Status: ACTIVE | Noted: 2020-01-29

## 2020-02-27 DIAGNOSIS — I10 BENIGN ESSENTIAL HYPERTENSION: ICD-10-CM

## 2020-02-27 DIAGNOSIS — G47.00 INSOMNIA, UNSPECIFIED TYPE: ICD-10-CM

## 2020-02-27 RX ORDER — LISINOPRIL 10 MG/1
10 TABLET ORAL DAILY
Qty: 90 TABLET | Refills: 0 | Status: SHIPPED | OUTPATIENT
Start: 2020-02-27 | End: 2020-03-25

## 2020-02-27 NOTE — TELEPHONE ENCOUNTER
Zolpidem 10 MG       Last Written Prescription Date:  12/19/19  Last Fill Quantity: 30,   # refills: 0  Last Office Visit: 12/6/19  Future Office visit:       Routing refill request to provider for review/approval because:  Drug not on the [facility], [facility] or [facility] refill protocol or controlled substance

## 2020-02-27 NOTE — TELEPHONE ENCOUNTER
Can you please call Bney and let him know I refilled his prescription so he would not run out.  I'd like him to follow-up at his PCP's office for nurse visit/blood pressure check.    Thank you,  Kelsi Chino, CNP

## 2020-03-02 RX ORDER — ZOLPIDEM TARTRATE 10 MG/1
TABLET ORAL
Qty: 30 TABLET | Refills: 0 | Status: SHIPPED | OUTPATIENT
Start: 2020-03-02 | End: 2020-05-11

## 2020-03-23 DIAGNOSIS — I10 BENIGN ESSENTIAL HYPERTENSION: ICD-10-CM

## 2020-03-23 NOTE — TELEPHONE ENCOUNTER
Called patient to reschedule appointment. Left a  for return call.    Ask patient if he needs a refill of his BP meds and where he would like them sent.    Also ask if he's been using an at home BP machine and if so what have the readings been like? Give examples?     Idalia Cadet on 3/23/2020 at 4:07 PM

## 2020-03-25 RX ORDER — LISINOPRIL 10 MG/1
10 TABLET ORAL DAILY
Qty: 90 TABLET | Refills: 2 | Status: SHIPPED | OUTPATIENT
Start: 2020-03-25 | End: 2021-01-06

## 2020-03-25 NOTE — TELEPHONE ENCOUNTER
Beny returned call, he will call back at a later date to reschedule his appointment he didn't have his schedule with him. He does need a refill on his bp medication and would like the prescription sent to [facility].  He does not have a home bp machine but said he is feeling fine, he said if needed he can go to the pharmacy to have his pressures done.

## 2020-05-11 DIAGNOSIS — G47.00 INSOMNIA, UNSPECIFIED TYPE: ICD-10-CM

## 2020-05-11 RX ORDER — ZOLPIDEM TARTRATE 10 MG/1
TABLET ORAL
Qty: 30 TABLET | Refills: 0 | Status: SHIPPED | OUTPATIENT
Start: 2020-05-11 | End: 2020-07-20

## 2020-05-11 NOTE — TELEPHONE ENCOUNTER
Zolpidem 10 mg       Last Written Prescription Date:  03/02/2020  Last Fill Quantity: 30,   # refills: 0  Last Office Visit: 01/27/2020  Future Office visit:       Routing refill request to provider for review/approval because:  Drug not on the [facility], [facility] or [facility] refill protocol or controlled substance

## 2020-07-19 DIAGNOSIS — G47.00 INSOMNIA, UNSPECIFIED TYPE: ICD-10-CM

## 2020-07-20 RX ORDER — ZOLPIDEM TARTRATE 10 MG/1
TABLET ORAL
Qty: 30 TABLET | Refills: 0 | Status: SHIPPED | OUTPATIENT
Start: 2020-07-20 | End: 2020-09-16

## 2020-07-20 NOTE — TELEPHONE ENCOUNTER
Zolpidem 10 MG       Last Written Prescription Date:  5/11/2020  Last Fill Quantity: 30,   # refills: 0  Last Office Visit: 12/6/19  Future Office visit:       Routing refill request to provider for review/approval because:  Drug not on the [facility], [facility] or [facility] refill protocol or controlled substance

## 2020-09-15 DIAGNOSIS — G47.00 INSOMNIA, UNSPECIFIED TYPE: ICD-10-CM

## 2020-09-15 NOTE — TELEPHONE ENCOUNTER
Ambien      Last Written Prescription Date:  7/20/2020  Last Fill Quantity: 30,   # refills: 0  Last Office Visit: 12/6/19  Future Office visit:       Routing refill request to provider for review/approval because:  Drug not on the [facility], [facility] or [facility] refill protocol or controlled substance

## 2020-09-16 RX ORDER — ZOLPIDEM TARTRATE 10 MG/1
TABLET ORAL
Qty: 30 TABLET | Refills: 0 | Status: SHIPPED | OUTPATIENT
Start: 2020-09-16 | End: 2020-11-11

## 2020-11-11 DIAGNOSIS — G47.00 INSOMNIA, UNSPECIFIED TYPE: ICD-10-CM

## 2020-11-11 RX ORDER — ZOLPIDEM TARTRATE 10 MG/1
TABLET ORAL
Qty: 30 TABLET | Refills: 0 | Status: SHIPPED | OUTPATIENT
Start: 2020-11-11 | End: 2021-01-05

## 2020-11-11 NOTE — TELEPHONE ENCOUNTER
Ambien      Last Written Prescription Date:  9/16/2020  Last Fill Quantity: 30,   # refills: 0  Last Office Visit: 12/6/19  Future Office visit:       Routing refill request to provider for review/approval because:  Drug not on the [facility], [facility] or [facility] refill protocol or controlled substance

## 2021-01-05 DIAGNOSIS — G47.00 INSOMNIA, UNSPECIFIED TYPE: ICD-10-CM

## 2021-01-05 RX ORDER — ZOLPIDEM TARTRATE 10 MG/1
TABLET ORAL
Qty: 30 TABLET | Refills: 0 | Status: SHIPPED | OUTPATIENT
Start: 2021-01-05 | End: 2021-03-02

## 2021-01-05 NOTE — TELEPHONE ENCOUNTER
Requested Prescriptions   Pending Prescriptions Disp Refills     zolpidem (AMBIEN) 10 MG tablet [Pharmacy Med Name: ZOLPIDEM TARTRATE 10MG TABS] 30 tablet 0     Sig: TAKE ONE-HALF TO ONE TABLET BY MOUTH EVERY NIGHT AT BEDTIME AS NEEDED     Last Written Prescription Date:  11/11/2020  Last Fill Quantity: 30,   # refills: 0  Last Office Visit: 01/27/2020  Future Office visit:       Routing refill request to provider for review/approval because:  Drug not on the [facility], [facility] or [facility] refill protocol or controlled substance    Regina Velasquez MA

## 2021-03-01 DIAGNOSIS — G47.00 INSOMNIA, UNSPECIFIED TYPE: ICD-10-CM

## 2021-03-01 NOTE — TELEPHONE ENCOUNTER
Ambien      Last Written Prescription Date:  1/5/2021  Last Fill Quantity: 30,   # refills: 0  Last Office Visit: 12/6/19  Future Office visit:       Routing refill request to provider for review/approval because:  Drug not on the [facility], [facility] or [facility] refill protocol or controlled substance

## 2021-03-02 RX ORDER — ZOLPIDEM TARTRATE 10 MG/1
TABLET ORAL
Qty: 30 TABLET | Refills: 0 | Status: SHIPPED | OUTPATIENT
Start: 2021-03-02 | End: 2021-04-28

## 2021-04-27 DIAGNOSIS — G47.00 INSOMNIA, UNSPECIFIED TYPE: ICD-10-CM

## 2021-04-27 NOTE — TELEPHONE ENCOUNTER
Ambien      Last Written Prescription Date:  3/2/2021  Last Fill Quantity: 30,   # refills: 0  Last Office Visit: 12//6/2019  Future Office visit:       Routing refill request to provider for review/approval because:  Drug not on the [facility], [facility] or [facility] refill protocol or controlled substance

## 2021-04-28 RX ORDER — ZOLPIDEM TARTRATE 10 MG/1
TABLET ORAL
Qty: 30 TABLET | Refills: 0 | Status: SHIPPED | OUTPATIENT
Start: 2021-04-28 | End: 2021-07-05

## 2021-05-24 ENCOUNTER — OFFICE VISIT (OUTPATIENT)
Dept: PODIATRY | Facility: CLINIC | Age: 46
End: 2021-05-24
Payer: COMMERCIAL

## 2021-05-24 VITALS
HEIGHT: 63 IN | SYSTOLIC BLOOD PRESSURE: 148 MMHG | WEIGHT: 194 LBS | DIASTOLIC BLOOD PRESSURE: 84 MMHG | BODY MASS INDEX: 34.38 KG/M2

## 2021-05-24 DIAGNOSIS — L60.3 ONYCHODYSTROPHY: ICD-10-CM

## 2021-05-24 DIAGNOSIS — B35.1 ONYCHOMYCOSIS: Primary | ICD-10-CM

## 2021-05-24 PROCEDURE — 99203 OFFICE O/P NEW LOW 30 MIN: CPT | Performed by: PODIATRIST

## 2021-05-24 RX ORDER — TERBINAFINE HYDROCHLORIDE 250 MG/1
250 TABLET ORAL DAILY
Qty: 30 TABLET | Refills: 2 | Status: SHIPPED | OUTPATIENT
Start: 2021-05-24 | End: 2021-09-13

## 2021-05-24 ASSESSMENT — MIFFLIN-ST. JEOR: SCORE: 1653.11

## 2021-05-24 ASSESSMENT — PAIN SCALES - GENERAL: PAINLEVEL: NO PAIN (0)

## 2021-05-24 NOTE — PROGRESS NOTES
HPI:  Beny Nelson is a 45 year old male who is seen in consultation at the request of self.    Pt presents for eval of:   (Onset, Location, L/R, Character, Treatments, Injury if yes)     1. Ongoing for approx 20 years, toenail fungus that started out with Right great toenail spread to the rest of toenails on Right foot. No injury noted.  Toenails are thick, discolored.  Many years ok was prescribed lamisil oral, unsure if he finished prescribed amount.  Tried OTC topical without change.  Files toenails about every 2 weeks.    2. Callus medial Right great toe.     of his own practice.    Weight management plan: Patient was referred to their PCP to discuss a diet and exercise plan.     Beny to follow up with Primary Care provider regarding elevated blood pressure.    Review of Systems:  Patient denies fever, chills, rash, wound, stiffness, limping, numbness, weakness, heart burn, blood in stool, chest pain with activity, calf pain when walking, shortness of breath with activity, chronic cough, easy bleeding/bruising, swelling of ankles, excessive thirst, fatigue, depression, anxiety.       PAST MEDICAL HISTORY:   Past Medical History:   Diagnosis Date     Bariatric surgery status         PAST SURGICAL HISTORY:   Past Surgical History:   Procedure Laterality Date     COLONOSCOPY N/A 12/8/2017    Procedure: COLONOSCOPY;  Colonoscopy, Combined Esophagoscopy, Gastroscopy, Duodenoscopy;  Surgeon: Richard Gomez MD;  Location:  GI     ESOPHAGOSCOPY, GASTROSCOPY, DUODENOSCOPY (EGD), COMBINED N/A 12/8/2017    Procedure: COMBINED ESOPHAGOSCOPY, GASTROSCOPY, DUODENOSCOPY (EGD);;  Surgeon: Richard Gomez MD;  Location:  GI     GASTRIC BYPASS  2005        MEDICATIONS:   Current Outpatient Medications:      ibuprofen (ADVIL) 200 MG tablet, Take 200 mg by mouth every 4 hours as needed., Disp: , Rfl:      lisinopril (ZESTRIL) 10 MG tablet, TAKE ONE TABLET BY MOUTH ONCE DAILY, Disp: 90 tablet, Rfl: 1      terbinafine (LAMISIL) 250 MG tablet, Take 1 tablet (250 mg) by mouth daily, Disp: 30 tablet, Rfl: 2     Acetaminophen (TYLENOL PO), , Disp: , Rfl:      VIAGRA 100 MG tablet, TAKE 1/2 TABLET BY MOUTH ONCE A DAY AS NEEDED - TAKE AS DIRECTED., Disp: 10 tablet, Rfl: 5     zolpidem (AMBIEN) 10 MG tablet, TAKE ONE-HALF TO ONE TABLET BY MOUTH EVERY NIGHT AT BEDTIME AS NEEDED, Disp: 30 tablet, Rfl: 0     ALLERGIES:  No Known Allergies     SOCIAL HISTORY:   Social History     Socioeconomic History     Marital status: Single     Spouse name: Not on file     Number of children: Not on file     Years of education: Not on file     Highest education level: Not on file   Occupational History     Not on file   Social Needs     Financial resource strain: Not on file     Food insecurity     Worry: Not on file     Inability: Not on file     Transportation needs     Medical: Not on file     Non-medical: Not on file   Tobacco Use     Smoking status: Never Smoker     Smokeless tobacco: Never Used     Tobacco comment: no smokers in household   Substance and Sexual Activity     Alcohol use: Yes     Drug use: No     Sexual activity: Yes     Partners: Female     Birth control/protection: Surgical     Comment: tubal   Lifestyle     Physical activity     Days per week: Not on file     Minutes per session: Not on file     Stress: Not on file   Relationships     Social connections     Talks on phone: Not on file     Gets together: Not on file     Attends Rastafarian service: Not on file     Active member of club or organization: Not on file     Attends meetings of clubs or organizations: Not on file     Relationship status: Not on file     Intimate partner violence     Fear of current or ex partner: Not on file     Emotionally abused: Not on file     Physically abused: Not on file     Forced sexual activity: Not on file   Other Topics Concern     Parent/sibling w/ CABG, MI or angioplasty before 65F 55M? Not Asked   Social History Narrative     Not  "on file        FAMILY HISTORY:   Family History   Problem Relation Age of Onset     Diabetes Father      Cancer Paternal Grandfather         EXAM:Vitals: BP (!) 148/84 (BP Location: Left arm, Patient Position: Sitting, Cuff Size: Adult Regular)   Ht 1.605 m (5' 3.19\")   Wt 88 kg (194 lb)   BMI 34.16 kg/m    BMI= Body mass index is 34.16 kg/m .    General appearance: Patient is alert and fully cooperative with history & exam.  No sign of distress is noted during the visit.     Psychiatric: Affect is pleasant & appropriate.  Patient appears motivated to improve health.     Respiratory: Breathing is regular & unlabored while sitting.     HEENT: Hearing is intact to spoken word.  Speech is clear.  No gross evidence of visual impairment that would impact ambulation.     Vascular: DP & PT pulses are intact & regular bilaterally.  No significant edema or varicosities noted.  CFT and skin temperature is normal to both lower extremities.     Neurologic: Lower extremity sensation is intact to light touch.  No evidence of weakness or contracture in the lower extremities.  No evidence of neuropathy.    Dermatologic: Toenails on the right foot are lysing discolored with subungual debris and dry excoriated epidermis noted on the right foot.  Normal texture turgor and tone about the integument.  Dry excoriated epidermis with macerated skin interdigitally.  No open fissures or ulcers.  Several toenails are thickened, discolored, lysing, with subungual debris.     Musculoskeletal: Patient is ambulatory without assistive device or brace.  No gross ankle deformity noted.  No foot or ankle joint effusion is noted.       ASSESSMENT:       ICD-10-CM    1. Onychomycosis  B35.1 terbinafine (LAMISIL) 250 MG tablet   2. Onychodystrophy  L60.3      Onychomycosis  Tinea pedis     PLAN:  Reviewed patient's chart in epic. Discussed treatment options.  Efficacy of topical medication may average around 8% or slightly more.  He is used multiple " topical antifungals for months without much change.      Discussed oral treatments. Terbinafine demonstrates approximately 80% efficacy for 5 years with slightly greater coverage spectrum than iconazole.  We discussed risk factors with this treatment and alternative options.  I reviewed most recent hepatic function panel.  Patient does not drink alcohol and does not utilize any medications metabolized by the liver.  Dispensed RX to complete a 90 day course of oral Lamisil 250 mg once daily.  Patient may follow up in 3 months to monitor therapy or consider adding another pulse if needed.  All questions were answered to their satisfaction.      Federico Lambert DPM

## 2021-05-24 NOTE — LETTER
5/24/2021         RE: Beny Nelson  [address]  [facility] [id number]        Dear Colleague,    Thank you for referring your patient, Beny Nelson, to the [facility]. Please see a copy of my visit note below.    HPI:  Beny Nelson is a 45 year old male who is seen in consultation at the request of self.    Pt presents for eval of:   (Onset, Location, L/R, Character, Treatments, Injury if yes)     1. Ongoing for approx 20 years, toenail fungus that started out with Right great toenail spread to the rest of toenails on Right foot. No injury noted.  Toenails are thick, discolored.  Many years ok was prescribed lamisil oral, unsure if he finished prescribed amount.  Tried OTC topical without change.  Files toenails about every 2 weeks.    2. Callus medial Right great toe.     of his own practice.    Weight management plan: Patient was referred to their PCP to discuss a diet and exercise plan.     Beny to follow up with Primary Care provider regarding elevated blood pressure.    Review of Systems:  Patient denies fever, chills, rash, wound, stiffness, limping, numbness, weakness, heart burn, blood in stool, chest pain with activity, calf pain when walking, shortness of breath with activity, chronic cough, easy bleeding/bruising, swelling of ankles, excessive thirst, fatigue, depression, anxiety.       PAST MEDICAL HISTORY:   Past Medical History:   Diagnosis Date     Bariatric surgery status         PAST SURGICAL HISTORY:   Past Surgical History:   Procedure Laterality Date     COLONOSCOPY N/A 12/8/2017    Procedure: COLONOSCOPY;  Colonoscopy, Combined Esophagoscopy, Gastroscopy, Duodenoscopy;  Surgeon: Richard Gomez MD;  Location:  GI     ESOPHAGOSCOPY, GASTROSCOPY, DUODENOSCOPY (EGD), COMBINED N/A 12/8/2017    Procedure: COMBINED ESOPHAGOSCOPY, GASTROSCOPY, DUODENOSCOPY (EGD);;  Surgeon: Richard Gomez MD;  Location:  GI     GASTRIC BYPASS  2005         MEDICATIONS:   Current Outpatient Medications:      ibuprofen (ADVIL) 200 MG tablet, Take 200 mg by mouth every 4 hours as needed., Disp: , Rfl:      lisinopril (ZESTRIL) 10 MG tablet, TAKE ONE TABLET BY MOUTH ONCE DAILY, Disp: 90 tablet, Rfl: 1     terbinafine (LAMISIL) 250 MG tablet, Take 1 tablet (250 mg) by mouth daily, Disp: 30 tablet, Rfl: 2     Acetaminophen (TYLENOL PO), , Disp: , Rfl:      VIAGRA 100 MG tablet, TAKE 1/2 TABLET BY MOUTH ONCE A DAY AS NEEDED - TAKE AS DIRECTED., Disp: 10 tablet, Rfl: 5     zolpidem (AMBIEN) 10 MG tablet, TAKE ONE-HALF TO ONE TABLET BY MOUTH EVERY NIGHT AT BEDTIME AS NEEDED, Disp: 30 tablet, Rfl: 0     ALLERGIES:  No Known Allergies     SOCIAL HISTORY:   Social History     Socioeconomic History     Marital status: Single     Spouse name: Not on file     Number of children: Not on file     Years of education: Not on file     Highest education level: Not on file   Occupational History     Not on file   Social Needs     Financial resource strain: Not on file     Food insecurity     Worry: Not on file     Inability: Not on file     Transportation needs     Medical: Not on file     Non-medical: Not on file   Tobacco Use     Smoking status: Never Smoker     Smokeless tobacco: Never Used     Tobacco comment: no smokers in household   Substance and Sexual Activity     Alcohol use: Yes     Drug use: No     Sexual activity: Yes     Partners: Female     Birth control/protection: Surgical     Comment: tubal   Lifestyle     Physical activity     Days per week: Not on file     Minutes per session: Not on file     Stress: Not on file   Relationships     Social connections     Talks on phone: Not on file     Gets together: Not on file     Attends Roman Catholic service: Not on file     Active member of club or organization: Not on file     Attends meetings of clubs or organizations: Not on file     Relationship status: Not on file     Intimate partner violence     Fear of current or ex partner:  "Not on file     Emotionally abused: Not on file     Physically abused: Not on file     Forced sexual activity: Not on file   Other Topics Concern     Parent/sibling w/ CABG, MI or angioplasty before 65F 55M? Not Asked   Social History Narrative     Not on file        FAMILY HISTORY:   Family History   Problem Relation Age of Onset     Diabetes Father      Cancer Paternal Grandfather         EXAM:Vitals: BP (!) 148/84 (BP Location: Left arm, Patient Position: Sitting, Cuff Size: Adult Regular)   Ht 1.605 m (5' 3.19\")   Wt 88 kg (194 lb)   BMI 34.16 kg/m    BMI= Body mass index is 34.16 kg/m .    General appearance: Patient is alert and fully cooperative with history & exam.  No sign of distress is noted during the visit.     Psychiatric: Affect is pleasant & appropriate.  Patient appears motivated to improve health.     Respiratory: Breathing is regular & unlabored while sitting.     HEENT: Hearing is intact to spoken word.  Speech is clear.  No gross evidence of visual impairment that would impact ambulation.     Vascular: DP & PT pulses are intact & regular bilaterally.  No significant edema or varicosities noted.  CFT and skin temperature is normal to both lower extremities.     Neurologic: Lower extremity sensation is intact to light touch.  No evidence of weakness or contracture in the lower extremities.  No evidence of neuropathy.    Dermatologic: Toenails on the right foot are lysing discolored with subungual debris and dry excoriated epidermis noted on the right foot.  Normal texture turgor and tone about the integument.  Dry excoriated epidermis with macerated skin interdigitally.  No open fissures or ulcers.  Several toenails are thickened, discolored, lysing, with subungual debris.     Musculoskeletal: Patient is ambulatory without assistive device or brace.  No gross ankle deformity noted.  No foot or ankle joint effusion is noted.       ASSESSMENT:       ICD-10-CM    1. Onychomycosis  B35.1 " terbinafine (LAMISIL) 250 MG tablet   2. Onychodystrophy  L60.3      Onychomycosis  Tinea pedis     PLAN:  Reviewed patient's chart in epic. Discussed treatment options.  Efficacy of topical medication may average around 8% or slightly more.  He is used multiple topical antifungals for months without much change.      Discussed oral treatments. Terbinafine demonstrates approximately 80% efficacy for 5 years with slightly greater coverage spectrum than iconazole.  We discussed risk factors with this treatment and alternative options.  I reviewed most recent hepatic function panel.  Patient does not drink alcohol and does not utilize any medications metabolized by the liver.  Dispensed RX to complete a 90 day course of oral Lamisil 250 mg once daily.  Patient may follow up in 3 months to monitor therapy or consider adding another pulse if needed.  All questions were answered to their satisfaction.      Federico Lambert DPM            Again, thank you for allowing me to participate in the care of your patient.        Sincerely,        Federico Lambert DPM

## 2021-06-17 ENCOUNTER — OFFICE VISIT (OUTPATIENT)
Dept: FAMILY MEDICINE | Facility: CLINIC | Age: 46
End: 2021-06-17
Payer: COMMERCIAL

## 2021-06-17 VITALS
RESPIRATION RATE: 14 BRPM | WEIGHT: 192.5 LBS | HEART RATE: 102 BPM | OXYGEN SATURATION: 97 % | SYSTOLIC BLOOD PRESSURE: 131 MMHG | TEMPERATURE: 98.2 F | BODY MASS INDEX: 33.9 KG/M2 | DIASTOLIC BLOOD PRESSURE: 87 MMHG

## 2021-06-17 DIAGNOSIS — L72.0 EPIDERMOID CYST OF SKIN OF CHEST: Primary | ICD-10-CM

## 2021-06-17 PROCEDURE — 99212 OFFICE O/P EST SF 10 MIN: CPT | Performed by: PHYSICIAN ASSISTANT

## 2021-06-17 NOTE — PROGRESS NOTES
Assessment & Plan     Epidermoid cyst of skin of chest  We discussed that this area appears to be within the skin versus in the breast tissue.  Because it is decreasing in size, it should be monitored at this time.  If symptoms worsen or if the size increases or if this area does not resolve in the next couple weeks, he should be seen by his PCP for further evaluation.    10 minutes spent on the date of the encounter doing chart review, patient visit and documentation     Return in about 3 months (around 9/17/2021) for Recheck with primary care provider if not improved.    SAMMY Silveira [facility]    Luisa Swan is a 46 year old who presents for the following health issues     HPI     Concern - bump on breast  Onset: 2 months  Description: red bump, size of a quarter, not painful  Intensity: moderate  Progression of Symptoms:  Improving the last 2 days  Accompanying Signs & Symptoms: none  Previous history of similar problem: none  Precipitating factors:        Worsened by: none  Alleviating factors:        Improved by: none  Therapies tried and outcome: None    Beny presents to the clinic today for evaluation of a erythematous lesion on his left moyer that has been present for approximately 2 months.  He states when he first noticed it it was about the size of a quarter, however today it is a bit smaller than a dime.  It is not itchy or painful.  He has not done anything to this area to improve symptoms.    Review of Systems   ROS negative except as stated above.        Objective    /87 (BP Location: Left arm)   Pulse 102   Temp 98.2  F (36.8  C) (Temporal)   Resp 14   Wt 87.3 kg (192 lb 8 oz)   SpO2 97%   BMI 33.90 kg/m    Body mass index is 33.9 kg/m .  Physical Exam   GENERAL: healthy, alert and no distress  SKIN: Left pectoral area with a minimally erythematous nodule that is palpable within the epidermis.  No discharge noted.  It does not appear to be  connected to the underlying breast tissue.

## 2021-07-03 DIAGNOSIS — I10 BENIGN ESSENTIAL HYPERTENSION: ICD-10-CM

## 2021-07-03 DIAGNOSIS — G47.00 INSOMNIA, UNSPECIFIED TYPE: ICD-10-CM

## 2021-07-05 RX ORDER — LISINOPRIL 10 MG/1
TABLET ORAL
Qty: 90 TABLET | Refills: 1 | Status: SHIPPED | OUTPATIENT
Start: 2021-07-05 | End: 2022-01-14

## 2021-07-05 RX ORDER — ZOLPIDEM TARTRATE 10 MG/1
TABLET ORAL
Qty: 30 TABLET | Refills: 0 | Status: SHIPPED | OUTPATIENT
Start: 2021-07-05 | End: 2021-08-31

## 2021-07-05 NOTE — TELEPHONE ENCOUNTER
Pending Prescriptions:                       Disp   Refills    lisinopril (ZESTRIL) 10 MG tablet [Pharmac*90 tab*1        Sig: TAKE ONE TABLET BY MOUTH ONCE DAILY    zolpidem (AMBIEN) 10 MG tablet [Pharmacy M*30 tab*0        Sig: TAKE ONE-HALF TO ONE TABLET BY MOUTH EVERY NIGHT AT           BEDTIME AS NEEDED      Routing refill request to provider for review/approval because:  Labs not current:  Creatinine, potassium    Arabella Butterfield RN on 7/5/2021 at 2:59 PM

## 2021-07-07 ENCOUNTER — OFFICE VISIT (OUTPATIENT)
Dept: INTERNAL MEDICINE | Facility: CLINIC | Age: 46
End: 2021-07-07
Payer: COMMERCIAL

## 2021-07-07 VITALS
WEIGHT: 190.7 LBS | TEMPERATURE: 97.7 F | OXYGEN SATURATION: 98 % | BODY MASS INDEX: 33.58 KG/M2 | DIASTOLIC BLOOD PRESSURE: 72 MMHG | SYSTOLIC BLOOD PRESSURE: 138 MMHG | HEART RATE: 92 BPM | RESPIRATION RATE: 18 BRPM

## 2021-07-07 DIAGNOSIS — N52.9 IMPOTENCE OF ORGANIC ORIGIN: ICD-10-CM

## 2021-07-07 PROCEDURE — 99213 OFFICE O/P EST LOW 20 MIN: CPT | Performed by: INTERNAL MEDICINE

## 2021-07-07 RX ORDER — SILDENAFIL 100 MG/1
TABLET, FILM COATED ORAL
Qty: 10 TABLET | Refills: 5 | Status: SHIPPED | OUTPATIENT
Start: 2021-07-07 | End: 2022-07-25

## 2021-07-07 ASSESSMENT — PAIN SCALES - GENERAL: PAINLEVEL: NO PAIN (0)

## 2021-07-07 NOTE — PROGRESS NOTES
Luisa Swan is a 46 year old who presents for the following health issues     HPI     Medication Followup of Viagra    Taking Medication as prescribed: yes    Side Effects:  None    Medication Helping Symptoms:  yes      EMR reviewed including:             Complaint, History of Chief Complaint, Corresponding Review of Systems, and Complaint Specific Physical Examination.    #1   Patient presents today for follow-up and refill of Viagra.  Notes that is working well.  Generally his results with 50 mg.  Has had no headache or side effects from medication.  Last lab work was in December 2019.  All lab work was within normal limits.        Vital Signs:   /72 (BP Location: Right arm, Patient Position: Sitting, Cuff Size: Adult Regular)   Pulse 92   Temp 97.7  F (36.5  C) (Temporal)   Resp 18   Wt 86.5 kg (190 lb 11.2 oz)   SpO2 98%   BMI 33.58 kg/m               Decision Making    Problem and Complexity     1. Impotence of organic origin  Continue current medication  - sildenafil (VIAGRA) 100 MG tablet; TAKE 1/2 TABLET BY MOUTH ONCE A DAY AS NEEDED - TAKE AS DIRECTED.  Dispense: 10 tablet; Refill: 5          ------------------------------------------------------------------------------------------------------------------------------  Data    4=1/3 5=2/3    1  >3  Specialty (external) notes reviewed:   None  Individual tests ordered (by provider) reviewed:   None  Individual tests ordered (by provider):   None  Independent Historians Interview:   None  2  Review of outside (other providers) Tests:   None  3   Verbal Discussion with Specialists:    None    ----------------------------------------------------------------------------------------------------------------------------------  Risk   Prescription drug management:   Yes                                High risk for toxicity:   Decision regarding surgery:    None   Social determinants of health:   No   Decision regarding hospitalization:      None   Decision to withhold therapy:   None                               FOLLOW UP   I have asked the patient to make an appointment for followup with me as needed.  He will follow-up with his primary care provider for his routine healthcare maintenance.            I have carefully explained the diagnosis and treatment options to the patient.  The patient has displayed an understanding of the above, and all subsequent questions were answered.      DO LENCHO Bui    Portions of this note were produced using [employer]  Although every attempt at real-time proof reading has been made, occasional grammar/syntax errors may have been missed.

## 2021-07-17 ENCOUNTER — HEALTH MAINTENANCE LETTER (OUTPATIENT)
Age: 46
End: 2021-07-17

## 2021-08-30 DIAGNOSIS — G47.00 INSOMNIA, UNSPECIFIED TYPE: ICD-10-CM

## 2021-08-31 RX ORDER — ZOLPIDEM TARTRATE 10 MG/1
TABLET ORAL
Qty: 30 TABLET | Refills: 0 | Status: SHIPPED | OUTPATIENT
Start: 2021-08-31 | End: 2021-10-15

## 2021-08-31 NOTE — TELEPHONE ENCOUNTER
Routing refill request to provider for review/approval because:  Drug not on the [facility] refill protocol       Requested Prescriptions   Pending Prescriptions Disp Refills     zolpidem (AMBIEN) 10 MG tablet [Pharmacy Med Name: ZOLPIDEM TARTRATE 10MG TABS] 30 tablet 0     Sig: TAKE ONE-HALF TO ONE TABLET BY MOUTH EVERY NIGHT AT BEDTIME AS NEEDED       There is no refill protocol information for this order        Trinidad Mae RN

## 2021-09-09 DIAGNOSIS — B35.1 ONYCHOMYCOSIS: ICD-10-CM

## 2021-09-11 ENCOUNTER — HEALTH MAINTENANCE LETTER (OUTPATIENT)
Age: 46
End: 2021-09-11

## 2021-09-13 RX ORDER — TERBINAFINE HYDROCHLORIDE 250 MG/1
250 TABLET ORAL DAILY
Qty: 30 TABLET | Refills: 2 | Status: SHIPPED | OUTPATIENT
Start: 2021-09-13 | End: 2021-12-21

## 2021-09-13 NOTE — TELEPHONE ENCOUNTER
Lamisil      Last Written Prescription Date:  5/24/2021  Last Fill Quantity: 30,   # refills: 2  Last Office Visit: 7/7/2021  Future Office visit:       Routing refill request to provider for review/approval because:  Drug not on the [facility], [facility] or [facility] refill protocol or controlled substance

## 2021-10-15 DIAGNOSIS — G47.00 INSOMNIA, UNSPECIFIED TYPE: ICD-10-CM

## 2021-10-15 RX ORDER — ZOLPIDEM TARTRATE 10 MG/1
TABLET ORAL
Qty: 30 TABLET | Refills: 0 | Status: SHIPPED | OUTPATIENT
Start: 2021-10-15 | End: 2021-12-02

## 2021-12-01 DIAGNOSIS — G47.00 INSOMNIA, UNSPECIFIED TYPE: ICD-10-CM

## 2021-12-02 RX ORDER — ZOLPIDEM TARTRATE 10 MG/1
TABLET ORAL
Qty: 30 TABLET | Refills: 0 | Status: SHIPPED | OUTPATIENT
Start: 2021-12-02 | End: 2022-02-02

## 2021-12-02 NOTE — TELEPHONE ENCOUNTER
Ambien      Last Written Prescription Date:  10/15/2021  Last Fill Quantity: 30,   # refills: 0  Last Office Visit: 7/7/2021  Future Office visit:       Routing refill request to provider for review/approval because:  Drug not on the [facility], [facility] or [facility] refill protocol or controlled substance

## 2021-12-20 DIAGNOSIS — B35.1 ONYCHOMYCOSIS: ICD-10-CM

## 2021-12-21 RX ORDER — TERBINAFINE HYDROCHLORIDE 250 MG/1
TABLET ORAL
Qty: 30 TABLET | Refills: 2 | Status: SHIPPED | OUTPATIENT
Start: 2021-12-21 | End: 2022-03-17

## 2021-12-21 NOTE — TELEPHONE ENCOUNTER
Lamisil      Last Written Prescription Date:  9/13/2021  Last Fill Quantity: 30,   # refills: 2  Last Office Visit: 7/7/2021  Future Office visit:       Routing refill request to provider for review/approval because:  Drug not on the [facility], [facility] or [facility] refill protocol or controlled substance

## 2022-01-14 DIAGNOSIS — I10 BENIGN ESSENTIAL HYPERTENSION: ICD-10-CM

## 2022-01-14 RX ORDER — LISINOPRIL 10 MG/1
TABLET ORAL
Qty: 90 TABLET | Refills: 1 | Status: SHIPPED | OUTPATIENT
Start: 2022-01-14 | End: 2022-07-25

## 2022-01-14 NOTE — TELEPHONE ENCOUNTER
Routing refill request to provider for review/approval because:  Labs not current:  CRE, K+      Silvia Olivo RN

## 2022-01-31 DIAGNOSIS — G47.00 INSOMNIA, UNSPECIFIED TYPE: ICD-10-CM

## 2022-02-01 NOTE — TELEPHONE ENCOUNTER
Pending Prescriptions:                       Disp   Refills    zolpidem (AMBIEN) 10 MG tablet [Pharmacy M*30 tab*0        Sig: TAKE ONE-HALF TO ONE TABLET BY MOUTH EVERY NIGHT AT           BEDTIME AS NEEDED    Routing refill request to provider for review/approval because:  Drug not on the [facility] refill protocol

## 2022-02-02 RX ORDER — ZOLPIDEM TARTRATE 10 MG/1
TABLET ORAL
Qty: 30 TABLET | Refills: 0 | Status: SHIPPED | OUTPATIENT
Start: 2022-02-02 | End: 2022-04-01

## 2022-03-16 DIAGNOSIS — B35.1 ONYCHOMYCOSIS: ICD-10-CM

## 2022-03-17 RX ORDER — TERBINAFINE HYDROCHLORIDE 250 MG/1
TABLET ORAL
Qty: 30 TABLET | Refills: 2 | Status: SHIPPED | OUTPATIENT
Start: 2022-03-17 | End: 2023-03-03

## 2022-03-17 NOTE — TELEPHONE ENCOUNTER
Routing refill request to provider for review/approval because:  Drug not on the [facility] refill protocol     ZAINAB Lozano, RN  [facility]

## 2022-03-31 DIAGNOSIS — G47.00 INSOMNIA, UNSPECIFIED TYPE: ICD-10-CM

## 2022-03-31 NOTE — TELEPHONE ENCOUNTER
Ambien      Last Written Prescription Date:  2/2/2022  Last Fill Quantity: 30,   # refills: 0  Last Office Visit: 7/7/2021  Future Office visit:       Routing refill request to provider for review/approval because:  Drug not on the [facility], P or [facility] refill protocol or controlled substance

## 2022-04-01 RX ORDER — ZOLPIDEM TARTRATE 10 MG/1
TABLET ORAL
Qty: 30 TABLET | Refills: 0 | Status: SHIPPED | OUTPATIENT
Start: 2022-04-01 | End: 2022-05-31

## 2022-05-28 DIAGNOSIS — G47.00 INSOMNIA, UNSPECIFIED TYPE: ICD-10-CM

## 2022-05-31 RX ORDER — ZOLPIDEM TARTRATE 10 MG/1
TABLET ORAL
Qty: 30 TABLET | Refills: 0 | Status: SHIPPED | OUTPATIENT
Start: 2022-05-31 | End: 2022-07-21

## 2022-05-31 NOTE — TELEPHONE ENCOUNTER
Ambien      Last Written Prescription Date:  4/1/2022  Last Fill Quantity: 30,   # refills: 0  Last Office Visit: 7/7/2021  Future Office visit:       Routing refill request to provider for review/approval because:  Drug not on the [facility], P or [facility] refill protocol or controlled substance

## 2022-07-21 DIAGNOSIS — G47.00 INSOMNIA, UNSPECIFIED TYPE: ICD-10-CM

## 2022-07-21 DIAGNOSIS — I10 BENIGN ESSENTIAL HYPERTENSION: ICD-10-CM

## 2022-07-21 DIAGNOSIS — N52.9 IMPOTENCE OF ORGANIC ORIGIN: ICD-10-CM

## 2022-07-21 RX ORDER — ZOLPIDEM TARTRATE 10 MG/1
TABLET ORAL
Qty: 30 TABLET | Refills: 0 | Status: SHIPPED | OUTPATIENT
Start: 2022-07-21 | End: 2022-09-30

## 2022-07-21 NOTE — TELEPHONE ENCOUNTER
Requested Prescriptions   Pending Prescriptions Disp Refills     zolpidem (AMBIEN) 10 MG tablet [Pharmacy Med Name: ZOLPIDEM TARTRATE 10MG TABS] 30 tablet 0     Sig: TAKE ONE-HALF TO ONE TABLET BY MOUTH EVERY NIGHT AT BEDTIME AS NEEDED       There is no refill protocol information for this order        Last Written Prescription Date:  5/31/2022  Last Fill Quantity: 30,  # refills: 0  Last office visit: 6/17/2021 with prescribing provider:     Future Office Visit:

## 2022-07-25 RX ORDER — SILDENAFIL 100 MG/1
TABLET, FILM COATED ORAL
Qty: 10 TABLET | Refills: 0 | Status: SHIPPED | OUTPATIENT
Start: 2022-07-25 | End: 2022-09-30

## 2022-07-25 RX ORDER — LISINOPRIL 10 MG/1
TABLET ORAL
Qty: 30 TABLET | Refills: 0 | Status: SHIPPED | OUTPATIENT
Start: 2022-07-25 | End: 2022-08-29

## 2022-07-25 NOTE — TELEPHONE ENCOUNTER
Pending Prescriptions:                       Disp   Refills    lisinopril (ZESTRIL) 10 MG tablet [Pharmac*90 tab*1        Sig: TAKE ONE TABLET BY MOUTH ONCE DAILY    Routing refill request to provider for review/approval because:  Labs not current:  CR, Potassium  Patient needs to be seen because it has been more than 1 year since last office visit.  BP failed RN protocol    Trinidad Mae RN

## 2022-07-25 NOTE — TELEPHONE ENCOUNTER
Sildenfafil  Medication is being filled for 1 time refill only due to:  Patient needs to be seen because it has been more than one year since last visit.     Sending to scheduling for yearly office visit due    Trinidad Mae RN

## 2022-08-13 ENCOUNTER — HEALTH MAINTENANCE LETTER (OUTPATIENT)
Age: 47
End: 2022-08-13

## 2022-08-24 DIAGNOSIS — I10 BENIGN ESSENTIAL HYPERTENSION: ICD-10-CM

## 2022-08-26 NOTE — TELEPHONE ENCOUNTER
"Requested Prescriptions   Pending Prescriptions Disp Refills    lisinopril (ZESTRIL) 10 MG tablet [Pharmacy Med Name: LISINOPRIL 10MG TABS] 30 tablet 0     Sig: TAKE ONE TABLET BY MOUTH ONCE DAILY        ACE Inhibitors (Including Combos) Protocol Failed - 8/24/2022 12:11 PM        Failed - Blood pressure under 140/90 in past 12 months       BP Readings from Last 3 Encounters:   07/07/21 138/72   06/17/21 131/87   05/24/21 (!) 148/84                 Failed - Recent (12 mo) or future (30 days) visit within the authorizing provider's specialty     Patient has had an office visit with the authorizing provider or a provider within the authorizing providers department within the previous 12 mos or has a future within next 30 days. See \"Patient Info\" tab in inbasket, or \"Choose Columns\" in Meds & Orders section of the refill encounter.              Failed - Normal serum creatinine on file in past 12 months     Recent Labs   Lab Test 12/06/19  0911   CR 1.24       Ok to refill medication if creatinine is low          Failed - Normal serum potassium on file in past 12 months     Recent Labs   Lab Test 12/06/19  0911   POTASSIUM 4.2               Passed - Medication is active on med list        Passed - Patient is age 18 or older              EMMA CarrollN, RN     "

## 2022-08-29 RX ORDER — LISINOPRIL 10 MG/1
TABLET ORAL
Qty: 30 TABLET | Refills: 0 | Status: SHIPPED | OUTPATIENT
Start: 2022-08-29 | End: 2022-09-30

## 2022-09-28 DIAGNOSIS — N52.9 IMPOTENCE OF ORGANIC ORIGIN: ICD-10-CM

## 2022-09-28 DIAGNOSIS — I10 BENIGN ESSENTIAL HYPERTENSION: ICD-10-CM

## 2022-09-28 DIAGNOSIS — G47.00 INSOMNIA, UNSPECIFIED TYPE: ICD-10-CM

## 2022-09-30 RX ORDER — LISINOPRIL 10 MG/1
TABLET ORAL
Qty: 30 TABLET | Refills: 0 | Status: SHIPPED | OUTPATIENT
Start: 2022-09-30 | End: 2022-11-07

## 2022-09-30 RX ORDER — ZOLPIDEM TARTRATE 10 MG/1
TABLET ORAL
Qty: 30 TABLET | Refills: 0 | Status: SHIPPED | OUTPATIENT
Start: 2022-09-30 | End: 2022-11-07

## 2022-09-30 RX ORDER — SILDENAFIL 100 MG/1
TABLET, FILM COATED ORAL
Qty: 10 TABLET | Refills: 0 | Status: SHIPPED | OUTPATIENT
Start: 2022-09-30 | End: 2022-12-02

## 2022-09-30 NOTE — TELEPHONE ENCOUNTER
"Requested Prescriptions   Pending Prescriptions Disp Refills    sildenafil (VIAGRA) 100 MG tablet [Pharmacy Med Name: Sildenafil Citrate 100 MG Oral Tablet] 10 tablet 0     Sig: TAKE 1/2 (ONE-HALF) TABLET BY MOUTH ONCE DAILY AS NEEDED        Erectile Dysfuction Protocol Failed - 9/28/2022  9:37 AM        Failed - Recent (12 mo) or future (30 days) visit within the authorizing provider's specialty     Patient has had an office visit with the authorizing provider or a provider within the authorizing providers department within the previous 12 mos or has a future within next 30 days. See \"Patient Info\" tab in inbasket, or \"Choose Columns\" in Meds & Orders section of the refill encounter.              Passed - Absence of nitrates on medication list        Passed - Absence of Alpha Blockers on Med list        Passed - Medication is active on med list        Passed - Patient is age 18 or older               Ariana Fitzpatrick RN  "

## 2022-09-30 NOTE — TELEPHONE ENCOUNTER
"Requested Prescriptions   Pending Prescriptions Disp Refills    zolpidem (AMBIEN) 10 MG tablet [Pharmacy Med Name: ZOLPIDEM TARTRATE 10MG TABS] 30 tablet 0     Sig: TAKE ONE-HALF TO ONE TABLET BY MOUTH EVERY NIGHT AT BEDTIME AS NEEDED        There is no refill protocol information for this order        lisinopril (ZESTRIL) 10 MG tablet [Pharmacy Med Name: LISINOPRIL 10MG TABS] 30 tablet 0     Sig: TAKE ONE TABLET ( 10 MG) BY MOUTH ONCE DAILY        ACE Inhibitors (Including Combos) Protocol Failed - 9/28/2022  9:35 AM        Failed - Blood pressure under 140/90 in past 12 months       BP Readings from Last 3 Encounters:   07/07/21 138/72   06/17/21 131/87   05/24/21 (!) 148/84                 Failed - Recent (12 mo) or future (30 days) visit within the authorizing provider's specialty     Patient has had an office visit with the authorizing provider or a provider within the authorizing providers department within the previous 12 mos or has a future within next 30 days. See \"Patient Info\" tab in inbasket, or \"Choose Columns\" in Meds & Orders section of the refill encounter.              Failed - Normal serum creatinine on file in past 12 months     Recent Labs   Lab Test 12/06/19  0911   CR 1.24       Ok to refill medication if creatinine is low          Failed - Normal serum potassium on file in past 12 months     Recent Labs   Lab Test 12/06/19  0911   POTASSIUM 4.2               Passed - Medication is active on med list        Passed - Patient is age 18 or older               Ariana Fitzpatrick RN  "

## 2022-10-30 ENCOUNTER — HEALTH MAINTENANCE LETTER (OUTPATIENT)
Age: 47
End: 2022-10-30

## 2022-11-04 DIAGNOSIS — I10 BENIGN ESSENTIAL HYPERTENSION: ICD-10-CM

## 2022-11-04 DIAGNOSIS — G47.00 INSOMNIA, UNSPECIFIED TYPE: ICD-10-CM

## 2022-11-07 RX ORDER — LISINOPRIL 10 MG/1
TABLET ORAL
Qty: 30 TABLET | Refills: 0 | Status: SHIPPED | OUTPATIENT
Start: 2022-11-07 | End: 2022-12-02

## 2022-11-07 RX ORDER — ZOLPIDEM TARTRATE 10 MG/1
TABLET ORAL
Qty: 30 TABLET | Refills: 0 | Status: SHIPPED | OUTPATIENT
Start: 2022-11-07 | End: 2023-01-20

## 2022-11-07 NOTE — TELEPHONE ENCOUNTER
"Pending Prescriptions:                       Disp   Refills    zolpidem (AMBIEN) 10 MG tablet [Pharmacy M*30 tab*0        Sig: TAKE ONE-HALF TO ONE TABLET BY MOUTH EVERY NIGHT AT           BEDTIME AS NEEDED    lisinopril (ZESTRIL) 10 MG tablet [Pharmac*30 tab*0        Sig: TAKE ONE TABLET ( 10 MG) BY MOUTH ONCE DAILY    Routing refill request to provider for review/approval because:  Labs out of range:      Requested Prescriptions   Pending Prescriptions Disp Refills    zolpidem (AMBIEN) 10 MG tablet [Pharmacy Med Name: ZOLPIDEM TARTRATE 10MG TABS] 30 tablet 0     Sig: TAKE ONE-HALF TO ONE TABLET BY MOUTH EVERY NIGHT AT BEDTIME AS NEEDED       There is no refill protocol information for this order       lisinopril (ZESTRIL) 10 MG tablet [Pharmacy Med Name: LISINOPRIL 10MG TABS] 30 tablet 0     Sig: TAKE ONE TABLET ( 10 MG) BY MOUTH ONCE DAILY       ACE Inhibitors (Including Combos) Protocol Failed - 11/4/2022  9:23 AM        Failed - Blood pressure under 140/90 in past 12 months     BP Readings from Last 3 Encounters:   07/07/21 138/72   06/17/21 131/87   05/24/21 (!) 148/84                 Failed - Normal serum creatinine on file in past 12 months     Recent Labs   Lab Test 12/06/19  0911   CR 1.24       Ok to refill medication if creatinine is low          Failed - Normal serum potassium on file in past 12 months     Recent Labs   Lab Test 12/06/19  0911   POTASSIUM 4.2             Passed - Recent (12 mo) or future (30 days) visit within the authorizing provider's specialty     Patient has had an office visit with the authorizing provider or a provider within the authorizing providers department within the previous 12 mos or has a future within next 30 days. See \"Patient Info\" tab in inbasket, or \"Choose Columns\" in Meds & Orders section of the refill encounter.              Passed - Medication is active on med list        Passed - Patient is age 18 or older                   "

## 2022-12-02 ENCOUNTER — OFFICE VISIT (OUTPATIENT)
Dept: FAMILY MEDICINE | Facility: CLINIC | Age: 47
End: 2022-12-02
Payer: COMMERCIAL

## 2022-12-02 VITALS
TEMPERATURE: 98.1 F | RESPIRATION RATE: 10 BRPM | BODY MASS INDEX: 33.29 KG/M2 | WEIGHT: 195 LBS | DIASTOLIC BLOOD PRESSURE: 88 MMHG | OXYGEN SATURATION: 97 % | HEART RATE: 87 BPM | HEIGHT: 64 IN | SYSTOLIC BLOOD PRESSURE: 128 MMHG

## 2022-12-02 DIAGNOSIS — N52.9 IMPOTENCE OF ORGANIC ORIGIN: ICD-10-CM

## 2022-12-02 DIAGNOSIS — R97.20 ELEVATED PROSTATE SPECIFIC ANTIGEN (PSA): ICD-10-CM

## 2022-12-02 DIAGNOSIS — R53.83 OTHER FATIGUE: ICD-10-CM

## 2022-12-02 DIAGNOSIS — Z12.5 SCREENING FOR PROSTATE CANCER: ICD-10-CM

## 2022-12-02 DIAGNOSIS — Z13.6 CARDIOVASCULAR SCREENING; LDL GOAL LESS THAN 160: ICD-10-CM

## 2022-12-02 DIAGNOSIS — D50.9 IRON DEFICIENCY ANEMIA, UNSPECIFIED IRON DEFICIENCY ANEMIA TYPE: ICD-10-CM

## 2022-12-02 DIAGNOSIS — Z00.01 ENCOUNTER FOR GENERAL ADULT MEDICAL EXAMINATION WITH ABNORMAL FINDINGS: Primary | ICD-10-CM

## 2022-12-02 DIAGNOSIS — I10 BENIGN ESSENTIAL HYPERTENSION: ICD-10-CM

## 2022-12-02 LAB
ALBUMIN SERPL-MCNC: 4.2 G/DL (ref 3.4–5)
ALP SERPL-CCNC: 104 U/L (ref 40–150)
ALT SERPL W P-5'-P-CCNC: 40 U/L (ref 0–70)
ANION GAP SERPL CALCULATED.3IONS-SCNC: 4 MMOL/L (ref 3–14)
AST SERPL W P-5'-P-CCNC: 30 U/L (ref 0–45)
BILIRUB SERPL-MCNC: 0.4 MG/DL (ref 0.2–1.3)
BUN SERPL-MCNC: 15 MG/DL (ref 7–30)
CALCIUM SERPL-MCNC: 8.9 MG/DL (ref 8.5–10.1)
CHLORIDE BLD-SCNC: 108 MMOL/L (ref 94–109)
CHOLEST SERPL-MCNC: 185 MG/DL
CO2 SERPL-SCNC: 27 MMOL/L (ref 20–32)
CREAT SERPL-MCNC: 0.99 MG/DL (ref 0.66–1.25)
ERYTHROCYTE [DISTWIDTH] IN BLOOD BY AUTOMATED COUNT: 14.6 % (ref 10–15)
FASTING STATUS PATIENT QL REPORTED: YES
GFR SERPL CREATININE-BSD FRML MDRD: >90 ML/MIN/1.73M2
GLUCOSE BLD-MCNC: 92 MG/DL (ref 70–99)
HCT VFR BLD AUTO: 44.8 % (ref 40–53)
HDLC SERPL-MCNC: 67 MG/DL
HGB BLD-MCNC: 14.5 G/DL (ref 13.3–17.7)
LDLC SERPL CALC-MCNC: 90 MG/DL
MCH RBC QN AUTO: 25.9 PG (ref 26.5–33)
MCHC RBC AUTO-ENTMCNC: 32.4 G/DL (ref 31.5–36.5)
MCV RBC AUTO: 80 FL (ref 78–100)
NONHDLC SERPL-MCNC: 118 MG/DL
PLATELET # BLD AUTO: 288 10E3/UL (ref 150–450)
POTASSIUM BLD-SCNC: 4.2 MMOL/L (ref 3.4–5.3)
PROT SERPL-MCNC: 7.7 G/DL (ref 6.8–8.8)
PSA SERPL-MCNC: 5.29 UG/L (ref 0–4)
RBC # BLD AUTO: 5.59 10E6/UL (ref 4.4–5.9)
SODIUM SERPL-SCNC: 139 MMOL/L (ref 133–144)
TRIGL SERPL-MCNC: 140 MG/DL
WBC # BLD AUTO: 9 10E3/UL (ref 4–11)

## 2022-12-02 PROCEDURE — 80053 COMPREHEN METABOLIC PANEL: CPT | Performed by: FAMILY MEDICINE

## 2022-12-02 PROCEDURE — G0103 PSA SCREENING: HCPCS | Performed by: FAMILY MEDICINE

## 2022-12-02 PROCEDURE — 99396 PREV VISIT EST AGE 40-64: CPT | Performed by: FAMILY MEDICINE

## 2022-12-02 PROCEDURE — 84403 ASSAY OF TOTAL TESTOSTERONE: CPT | Performed by: FAMILY MEDICINE

## 2022-12-02 PROCEDURE — 80061 LIPID PANEL: CPT | Performed by: FAMILY MEDICINE

## 2022-12-02 PROCEDURE — 84270 ASSAY OF SEX HORMONE GLOBUL: CPT | Performed by: FAMILY MEDICINE

## 2022-12-02 PROCEDURE — 85027 COMPLETE CBC AUTOMATED: CPT | Performed by: FAMILY MEDICINE

## 2022-12-02 PROCEDURE — 36415 COLL VENOUS BLD VENIPUNCTURE: CPT | Performed by: FAMILY MEDICINE

## 2022-12-02 PROCEDURE — 99213 OFFICE O/P EST LOW 20 MIN: CPT | Mod: 25 | Performed by: FAMILY MEDICINE

## 2022-12-02 RX ORDER — SILDENAFIL 100 MG/1
TABLET, FILM COATED ORAL
Qty: 10 TABLET | Refills: 4 | Status: SHIPPED | OUTPATIENT
Start: 2022-12-02 | End: 2023-03-03

## 2022-12-02 RX ORDER — LISINOPRIL 10 MG/1
TABLET ORAL
Qty: 90 TABLET | Refills: 3 | Status: SHIPPED | OUTPATIENT
Start: 2022-12-02 | End: 2023-03-03

## 2022-12-02 ASSESSMENT — ENCOUNTER SYMPTOMS
CONSTIPATION: 0
DYSURIA: 0
DIZZINESS: 0
FREQUENCY: 0
CHILLS: 0
PARESTHESIAS: 0
ABDOMINAL PAIN: 0
HEADACHES: 0
NERVOUS/ANXIOUS: 0
NAUSEA: 0
FEVER: 0
SHORTNESS OF BREATH: 0
HEARTBURN: 0
SORE THROAT: 0
PALPITATIONS: 0
WEAKNESS: 0
DIARRHEA: 0
EYE PAIN: 0
COUGH: 0
HEMATOCHEZIA: 0
MYALGIAS: 1
JOINT SWELLING: 0
HEMATURIA: 0
ARTHRALGIAS: 1

## 2022-12-02 NOTE — PROGRESS NOTES
SUBJECTIVE:   CC: Beny is an 47 year old who presents for preventative health visit.     Patient has been advised of split billing requirements and indicates understanding: Yes  Healthy Habits:     Getting at least 3 servings of Calcium per day:  Yes    Bi-annual eye exam:  NO    Dental care twice a year:  Yes    Sleep apnea or symptoms of sleep apnea:  None    Diet:  Regular (no restrictions)    Frequency of exercise:  2-3 days/week    Duration of exercise:  45-60 minutes    Taking medications regularly:  Yes    Barriers to taking medications:  None    Medication side effects:  None    PHQ-2 Total Score: 0    Additional concerns today:  No      Would like his testosterone checked.  He has had little fatigue and some trouble with impotence.  Refill on his blood pressure medicine and needs some lab work done.    Today's PHQ-2 Score:   PHQ-2 ( 1999 [employer]) 12/2/2022   Q1: Little interest or pleasure in doing things 0   Q2: Feeling down, depressed or hopeless 0   PHQ-2 Score 0   PHQ-2 Total Score (12-17 Years)- Positive if 3 or more points; Administer PHQ-A if positive -   Q1: Little interest or pleasure in doing things Not at all   Q2: Feeling down, depressed or hopeless Not at all   PHQ-2 Score 0       Have you ever done Advance Care Planning? (For example, a Health Directive, POLST, or a discussion with a medical provider or your loved ones about your wishes): No, advance care planning information given to patient to review.  Patient declined advance care planning discussion at this time.    Social History     Tobacco Use     Smoking status: Never     Smokeless tobacco: Never     Tobacco comments:     no smokers in household   Substance Use Topics     Alcohol use: Yes         Alcohol Use 12/2/2022   Prescreen: >3 drinks/day or >7 drinks/week? Not Applicable   Prescreen: >3 drinks/day or >7 drinks/week? -   AUDIT SCORE  -       Last PSA:   PSA   Date Value Ref Range Status   11/22/2017 1.03 0 - 4 ug/L Final      "Comment:     Assay Method:  Chemiluminescence using Siemens Vista analyzer       Reviewed orders with patient. Reviewed health maintenance and updated orders accordingly - Yes  Labs reviewed in EPIC    Reviewed and updated as needed this visit by clinical staff    Allergies  Meds              Reviewed and updated as needed this visit by Provider                 Past Medical History:   Diagnosis Date     Bariatric surgery status       Past Surgical History:   Procedure Laterality Date     COLONOSCOPY N/A 12/8/2017    Procedure: COLONOSCOPY;  Colonoscopy, Combined Esophagoscopy, Gastroscopy, Duodenoscopy;  Surgeon: Richard Gomez MD;  Location:  GI     ESOPHAGOSCOPY, GASTROSCOPY, DUODENOSCOPY (EGD), COMBINED N/A 12/8/2017    Procedure: COMBINED ESOPHAGOSCOPY, GASTROSCOPY, DUODENOSCOPY (EGD);;  Surgeon: Richard Gomez MD;  Location:  GI     GASTRIC BYPASS  2005       Review of Systems   Constitutional: Negative for chills and fever.   HENT: Negative for congestion, ear pain, hearing loss and sore throat.    Eyes: Negative for pain and visual disturbance.   Respiratory: Negative for cough and shortness of breath.    Cardiovascular: Negative for chest pain, palpitations and peripheral edema.   Gastrointestinal: Negative for abdominal pain, constipation, diarrhea, heartburn, hematochezia and nausea.   Genitourinary: Negative for dysuria, frequency, genital sores, hematuria and urgency.   Musculoskeletal: Positive for arthralgias and myalgias. Negative for joint swelling.   Skin: Negative for rash.   Neurological: Negative for dizziness, weakness, headaches and paresthesias.   Psychiatric/Behavioral: Negative for mood changes. The patient is not nervous/anxious.          OBJECTIVE:   /88   Pulse 87   Temp 98.1  F (36.7  C)   Resp 10   Ht 1.625 m (5' 3.98\")   Wt 88.5 kg (195 lb)   SpO2 97%   BMI 33.50 kg/m      Physical Exam  GENERAL: healthy, alert and no distress  EYES: Eyes grossly normal to " inspection, PERRL and conjunctivae and sclerae normal  HENT: ear canals and TM's normal, nose and mouth without ulcers or lesions  NECK: no adenopathy, no asymmetry, masses, or scars and thyroid normal to palpation  RESP: lungs clear to auscultation - no rales, rhonchi or wheezes  CV: regular rate and rhythm, normal S1 S2, no S3 or S4, no murmur, click or rub, no peripheral edema and peripheral pulses strong  ABDOMEN: soft, nontender, no hepatosplenomegaly, no masses and bowel sounds normal  MS: no gross musculoskeletal defects noted, no edema  SKIN: no suspicious lesions or rashes  NEURO: Normal strength and tone, mentation intact and speech normal  PSYCH: mentation appears normal, affect normal/bright    Diagnostic Test Results:  Labs reviewed in Epic  Results for orders placed or performed in visit on 12/02/22   Lipid panel reflex to direct LDL Fasting     Status: None   Result Value Ref Range    Cholesterol 185 <200 mg/dL    Triglycerides 140 <150 mg/dL    Direct Measure HDL 67 >=40 mg/dL    LDL Cholesterol Calculated 90 <=100 mg/dL    Non HDL Cholesterol 118 <130 mg/dL    Patient Fasting > 8hrs? Yes     Narrative    Cholesterol  Desirable:  <200 mg/dL    Triglycerides  Normal:  Less than 150 mg/dL  Borderline High:  150-199 mg/dL  High:  200-499 mg/dL  Very High:  Greater than or equal to 500 mg/dL    Direct Measure HDL  Female:  Greater than or equal to 50 mg/dL   Male:  Greater than or equal to 40 mg/dL    LDL Cholesterol  Desirable:  <100mg/dL  Above Desirable:  100-129 mg/dL   Borderline High:  130-159 mg/dL   High:  160-189 mg/dL   Very High:  >= 190 mg/dL    Non HDL Cholesterol  Desirable:  130 mg/dL  Above Desirable:  130-159 mg/dL  Borderline High:  160-189 mg/dL  High:  190-219 mg/dL  Very High:  Greater than or equal to 220 mg/dL   Comprehensive metabolic panel (BMP + Alb, Alk Phos, ALT, AST, Total. Bili, TP)     Status: Normal   Result Value Ref Range    Sodium 139 133 - 144 mmol/L    Potassium 4.2  3.4 - 5.3 mmol/L    Chloride 108 94 - 109 mmol/L    Carbon Dioxide (CO2) 27 20 - 32 mmol/L    Anion Gap 4 3 - 14 mmol/L    Urea Nitrogen 15 7 - 30 mg/dL    Creatinine 0.99 0.66 - 1.25 mg/dL    Calcium 8.9 8.5 - 10.1 mg/dL    Glucose 92 70 - 99 mg/dL    Alkaline Phosphatase 104 40 - 150 U/L    AST 30 0 - 45 U/L    ALT 40 0 - 70 U/L    Protein Total 7.7 6.8 - 8.8 g/dL    Albumin 4.2 3.4 - 5.0 g/dL    Bilirubin Total 0.4 0.2 - 1.3 mg/dL    GFR Estimate >90 >60 mL/min/1.73m2   PSA, screen     Status: Abnormal   Result Value Ref Range    Prostate Specific Antigen Screen 5.29 (H) 0.00 - 4.00 ug/L    Narrative    Assay Method:  Chemiluminescence using Siemens   Vista analyzer.   CBC with platelets     Status: Abnormal   Result Value Ref Range    WBC Count 9.0 4.0 - 11.0 10e3/uL    RBC Count 5.59 4.40 - 5.90 10e6/uL    Hemoglobin 14.5 13.3 - 17.7 g/dL    Hematocrit 44.8 40.0 - 53.0 %    MCV 80 78 - 100 fL    MCH 25.9 (L) 26.5 - 33.0 pg    MCHC 32.4 31.5 - 36.5 g/dL    RDW 14.6 10.0 - 15.0 %    Platelet Count 288 150 - 450 10e3/uL   Sex Hormone Binding Globulin     Status: Normal   Result Value Ref Range    Sex Hormone Binding Globulin 33 11 - 80 nmol/L   Testosterone Free and Total     Status: None   Result Value Ref Range    Free Testosterone Calculated 4.88 ng/dL    Testosterone Total 252 240 - 950 ng/dL    Narrative    This test was developed and its performance characteristics determined by the [facility],  [facility]. It has not been cleared or approved by the FDA. The laboratory is regulated under CLIA as qualified to perform high-complexity testing. This test is used for clinical purposes. It should not be regarded as investigational or for research.   Testosterone Free and Total     Status: None    Narrative    The following orders were created for panel order Testosterone Free and Total.  Procedure                               Abnormality         Status                  "    ---------                               -----------         ------                     Sex Hormone Binding Glob...[[id number]]  Normal              Final result               Testosterone Free and Total[[id number]]                      Final result                 Please view results for these tests on the individual orders.       ASSESSMENT/PLAN:   (Z00.01) Encounter for general adult medical examination with abnormal findings  (primary encounter diagnosis)  Comment: See below.  Plan:     (D50.9) Iron deficiency anemia, unspecified iron deficiency anemia type  Comment: The past.  We will notify him with results  Plan: CBC with platelets            (Z13.6) CARDIOVASCULAR SCREENING; LDL GOAL LESS THAN 160  Comment:   Plan: Lipid panel reflex to direct LDL Fasting            (I10) Benign essential hypertension  Comment: Doing well renewed his lisinopril.  Notify him of results.  Plan: Comprehensive metabolic panel (BMP + Alb, Alk         Phos, ALT, AST, Total. Bili, TP), lisinopril         (ZESTRIL) 10 MG tablet            (R53.83) Other fatigue  Comment: Notify results total lab late afternoon results are not as good as morning labs.  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  Plan:     Patient has been advised of split billing requirements and indicates understanding: Yes      COUNSELING:   Reviewed preventive health counseling, as reflected in patient instructions       Regular exercise       Healthy diet/nutrition       Vision screening      BMI:   Estimated body mass index is 33.5 kg/m  as calculated from the following:    Height as of this encounter: 1.625 m (5' 3.98\").    Weight " as of this encounter: 88.5 kg (195 lb).   Weight management plan: Discussed healthy diet and exercise guidelines      He reports that he has never smoked. He has never used smokeless tobacco.        Richard Cortes MD  [facility]

## 2022-12-03 LAB — SHBG SERPL-SCNC: 33 NMOL/L (ref 11–80)

## 2022-12-06 LAB
TESTOST FREE SERPL-MCNC: 4.88 NG/DL
TESTOST SERPL-MCNC: 252 NG/DL (ref 240–950)

## 2022-12-14 ENCOUNTER — TELEPHONE (OUTPATIENT)
Dept: FAMILY MEDICINE | Facility: CLINIC | Age: 47
End: 2022-12-14

## 2022-12-14 NOTE — TELEPHONE ENCOUNTER
----- Message from Richard Cortes MD sent at 12/14/2022 12:24 PM CST -----  Let him know his labs show his testosterone level is in the normal range but low end.  But as I pointed out to him this was done in the late afternoon.  Chemistry panel was entirely normal including blood sugar liver and kidney tests.  Cholesterol was good at 185 blood count was normal and hemoglobin was normal.  His PSA however was 5.29.  The upper end of the normal range is 4.  Sometimes this is just elevated because of the inflammatory condition.  The recommendation is to recheck this in 2 to 3 months if it still elevated needs to be looked into further with urology consult

## 2022-12-16 NOTE — TELEPHONE ENCOUNTER
"Patient calling back. RN relayed message from PCP below. Patient is scheduled for 2/10/23 for PSA lab.     Patient however would like PCP to explain more regarding \"inflamatory condition\" from note: His PSA however was 5.29.  The upper end of the normal range is 4.  Sometimes this is just elevated because of the inflammatory condition.    RN was unable to explain this more to patient. Can PCP explain more.     ZAINAB Carroll, RN     "

## 2023-01-02 NOTE — TELEPHONE ENCOUNTER
I have tried calling several times and it goes to an answering machine with no identification.  So I have not left a message.

## 2023-01-18 DIAGNOSIS — G47.00 INSOMNIA, UNSPECIFIED TYPE: ICD-10-CM

## 2023-01-20 ENCOUNTER — MYC MEDICAL ADVICE (OUTPATIENT)
Dept: FAMILY MEDICINE | Facility: CLINIC | Age: 48
End: 2023-01-20
Payer: COMMERCIAL

## 2023-01-20 RX ORDER — ZOLPIDEM TARTRATE 10 MG/1
TABLET ORAL
Qty: 30 TABLET | Refills: 0 | Status: SHIPPED | OUTPATIENT
Start: 2023-01-20 | End: 2023-03-03

## 2023-01-20 NOTE — TELEPHONE ENCOUNTER
Patient informed via [employer] to schedule, 1/25 reminder if not read to send letter.   Regina Velasquez MA     Please call patient to inform of limited refills.Dr. Cortes has retired and is no longer in clinic and patient will need to schedule appointment with any provider to establish new PCP prior to additional refills.   Vaughn Patterson MD

## 2023-01-20 NOTE — LETTER
[address] [address]  Phone: [phone]  Fax: [phone]    January 26, 2023      Beny Nelson                                                                                                                                [address] [facility] [address]        Dear Mr. Nelson,    We are concerned about your health care.  We recently provided you with a medication refill.  Many medications require routine follow-up with your Doctor.       At this time we ask that: You schedule an appointment for you to establish care with a new provider, now that Dr. Cortes has retired. Call the clinic at [phone] Option 1 to schedule.      Your prescription:  Has been refilled for 1 time so you may have time for the above noted follow-up.        Thank you,     Vaughn Patterson MD  Care Team

## 2023-02-10 ENCOUNTER — LAB (OUTPATIENT)
Dept: LAB | Facility: CLINIC | Age: 48
End: 2023-02-10
Payer: COMMERCIAL

## 2023-02-10 DIAGNOSIS — R97.20 ELEVATED PROSTATE SPECIFIC ANTIGEN (PSA): ICD-10-CM

## 2023-02-10 LAB — PSA SERPL-MCNC: 4.01 NG/ML (ref 0–2.5)

## 2023-02-10 PROCEDURE — 36415 COLL VENOUS BLD VENIPUNCTURE: CPT

## 2023-02-10 PROCEDURE — 84153 ASSAY OF PSA TOTAL: CPT

## 2023-03-03 ENCOUNTER — OFFICE VISIT (OUTPATIENT)
Dept: INTERNAL MEDICINE | Facility: CLINIC | Age: 48
End: 2023-03-03
Payer: COMMERCIAL

## 2023-03-03 VITALS
BODY MASS INDEX: 34.54 KG/M2 | SYSTOLIC BLOOD PRESSURE: 130 MMHG | WEIGHT: 202.3 LBS | OXYGEN SATURATION: 98 % | DIASTOLIC BLOOD PRESSURE: 92 MMHG | RESPIRATION RATE: 16 BRPM | TEMPERATURE: 98 F | HEIGHT: 64 IN | HEART RATE: 88 BPM

## 2023-03-03 DIAGNOSIS — Z12.5 SCREENING FOR PROSTATE CANCER: ICD-10-CM

## 2023-03-03 DIAGNOSIS — Z11.59 NEED FOR HEPATITIS C SCREENING TEST: ICD-10-CM

## 2023-03-03 DIAGNOSIS — G47.00 INSOMNIA, UNSPECIFIED TYPE: ICD-10-CM

## 2023-03-03 DIAGNOSIS — Z11.4 SCREENING FOR HIV (HUMAN IMMUNODEFICIENCY VIRUS): ICD-10-CM

## 2023-03-03 DIAGNOSIS — E66.01 MORBID OBESITY (H): Primary | ICD-10-CM

## 2023-03-03 DIAGNOSIS — I10 BENIGN ESSENTIAL HYPERTENSION: ICD-10-CM

## 2023-03-03 DIAGNOSIS — N52.9 IMPOTENCE OF ORGANIC ORIGIN: ICD-10-CM

## 2023-03-03 PROCEDURE — 99214 OFFICE O/P EST MOD 30 MIN: CPT | Performed by: INTERNAL MEDICINE

## 2023-03-03 RX ORDER — ZOLPIDEM TARTRATE 10 MG/1
TABLET ORAL
Qty: 30 TABLET | Refills: 0 | Status: SHIPPED | OUTPATIENT
Start: 2023-03-03 | End: 2023-06-01

## 2023-03-03 RX ORDER — LISINOPRIL 10 MG/1
TABLET ORAL
Qty: 90 TABLET | Refills: 3 | Status: SHIPPED | OUTPATIENT
Start: 2023-03-03 | End: 2024-04-19

## 2023-03-03 RX ORDER — SILDENAFIL 100 MG/1
TABLET, FILM COATED ORAL
Qty: 10 TABLET | Refills: 4 | Status: SHIPPED | OUTPATIENT
Start: 2023-03-03 | End: 2023-07-14

## 2023-03-03 ASSESSMENT — PAIN SCALES - GENERAL: PAINLEVEL: NO PAIN (0)

## 2023-03-03 NOTE — PROGRESS NOTES
Luisa Swan is a 47 year old, presenting for the following health issues:  Recheck Medication      History of Present Illness       Hypertension: He presents for follow up of hypertension.  He does not check blood pressure  regularly outside of the clinic. Outside blood pressures have been over 140/90. He does not follow a low salt diet.     He eats 0-1 servings of fruits and vegetables daily.He consumes 1 sweetened beverage(s) daily.He exercises with enough effort to increase his heart rate 30 to 60 minutes per day.  He exercises with enough effort to increase his heart rate 4 days per week.   He is taking medications regularly.          EMR reviewed including:             Complaint, History of Chief Complaint, Corresponding Review of Systems, and Complaint Specific Physical Examination.    #1   Establish care second complaint    #2   Follow-up on hypertension  Does not checks blood pressure at home  Denies headaches, chest pain or symptoms or urgency.  Current blood pressure 130/92  Currently taking 10 mg of lisinopril daily  Denies cough or side effects from the medication        Exam:   LUNGS: clear bilaterally, airflow is brisk, no intercostal retraction or stridor is noted. No coughing is noted during visit.   HEART:  regular without rubs, clicks, gallops, or murmurs. PMI is nondisplaced. Upstrokes are brisk. S1,S2 are heard.   GI: Abdomen is soft, without rebound, guarding or tenderness. Bowel sounds are appropriate. No renal bruits are heard.   NEURO: Pt is alert and appropriate. No neurologic lateralization is noted. Cranial nerves 2-12 are intact. Peripheral sensory and motor function are grossly normal.       #3  Complains of erectile dysfunction  Is able to initiate but not maintain adequate erection  Gets response from Viagra  Which is refilled  Denies headache, vision changes etc.  Denies any priopism        Patient has been interviewed, applicable history and applied review of systems  "have been performed.    Vital Signs:   BP (!) 130/92   Pulse 88   Temp 98  F (36.7  C) (Temporal)   Resp 16   Ht 1.613 m (5' 3.5\")   Wt 91.8 kg (202 lb 4.8 oz)   SpO2 98%   BMI 35.27 kg/m        Decision Making    Problem and Complexity     1. Benign essential hypertension  Continue Zestril.  Laboratory from December reviewed with patient.  - lisinopril (ZESTRIL) 10 MG tablet; TAKE ONE TABLET ( 10 MG) BY MOUTH ONCE DAILY  Dispense: 90 tablet; Refill: 3    2. Insomnia, unspecified type  Continue Ambien.  Recommend limiting to absolutely when it is necessary only  - zolpidem (AMBIEN) 10 MG tablet; TAKE ONE-HALF TO ONE TABLET BY MOUTH EVERY NIGHT AT BEDTIME AS NEEDED  Dispense: 30 tablet; Refill: 0    3. Impotence of organic origin  Continue Viagra as needed  - sildenafil (VIAGRA) 100 MG tablet; TAKE 1/2 (ONE-HALF) TABLET BY MOUTH ONCE DAILY AS NEEDED  Dispense: 10 tablet; Refill: 4    4. Morbid obesity (H)  Recommend weight loss responsible caloric restriction check PSA    5. Screening for prostate cancer    - PSA, screen; Future    6. Need for hepatitis C screening test  Patient refusing screening at this time    7. Screening for HIV (human immunodeficiency virus)  Patient notes screening not necessary as he is at low risk.                                FOLLOW UP   I have asked the patient to make an appointment for followup with either:  1.  Me,  2.  The patient's preferred provider,  3.  Any available provider  In 6 months        Regarding routine vaccinations:  I have reviewed the patient's vaccination schedule and discussed the benefits of prophylactic vaccination in detail.  I recommend the patient contact their pharmacist (not the pharmacy within the clinic) for vaccinations.  Discussed that most insurance companies now favor reimbursement to the pharmacies and it will financially behoove the patient to have vaccinations performed at their pharmacy.        I have carefully explained the diagnosis and " treatment options to the patient.  The patient has displayed an understanding of the above, and all subsequent questions were answered.      DO LENCHO Bui    Portions of this note were produced using [employer]  Although every attempt at real-time proof reading has been made, occasional grammar/syntax errors may have been missed.

## 2023-05-29 DIAGNOSIS — G47.00 INSOMNIA, UNSPECIFIED TYPE: ICD-10-CM

## 2023-05-30 NOTE — TELEPHONE ENCOUNTER
Pending Prescriptions:                       Disp   Refills    zolpidem (AMBIEN) 10 MG tablet [Pharmacy M*30 tab*0        Sig: TAKE ONE-HALF TO ONE TABLET BY MOUTH EVERY NIGHT AT           BEDTIME AS NEEDED    Routing refill request to provider for review/approval because:  Drug not on the [facility] refill protocol     Requested Prescriptions   Pending Prescriptions Disp Refills    zolpidem (AMBIEN) 10 MG tablet [Pharmacy Med Name: ZOLPIDEM TARTRATE 10MG TABS] 30 tablet 0     Sig: TAKE ONE-HALF TO ONE TABLET BY MOUTH EVERY NIGHT AT BEDTIME AS NEEDED       There is no refill protocol information for this order

## 2023-06-01 RX ORDER — ZOLPIDEM TARTRATE 10 MG/1
TABLET ORAL
Qty: 30 TABLET | Refills: 0 | Status: SHIPPED | OUTPATIENT
Start: 2023-06-01 | End: 2023-07-24

## 2023-07-12 DIAGNOSIS — N52.9 IMPOTENCE OF ORGANIC ORIGIN: ICD-10-CM

## 2023-07-13 ENCOUNTER — TELEPHONE (OUTPATIENT)
Dept: INTERNAL MEDICINE | Facility: CLINIC | Age: 48
End: 2023-07-13
Payer: COMMERCIAL

## 2023-07-13 NOTE — TELEPHONE ENCOUNTER
Prior Authorization Retail Medication Request    Medication/Dose: sildenafil (VIAGRA) 100 MG tablet  ICD code (if different than what is on RX):  Impotence of organic origin [N52.9]   Previously Tried and Failed:  na  Rationale:  na    Insurance Name:  [employer]  Insurance ID:  [id number]      Pharmacy Information (if different than what is on RX)  Name:  [facility]  Phone:  [phone]

## 2023-07-14 RX ORDER — SILDENAFIL 100 MG/1
TABLET, FILM COATED ORAL
Qty: 10 TABLET | Refills: 4 | Status: SHIPPED | OUTPATIENT
Start: 2023-07-14 | End: 2024-03-25

## 2023-07-18 NOTE — TELEPHONE ENCOUNTER
Central Prior Authorization Team   Phone: [phone]    PA Initiation    Medication: sildenafil (VIAGRA) 100 MG tablet  Insurance Company: [employer] [facility] - Phone [phone] Fax [phone]  Pharmacy Filling the Rx: [facility] [address] [place] [address] [address]  Filling Pharmacy Phone: [phone]  Filling Pharmacy Fax:    Start Date: 7/18/2023

## 2023-07-19 NOTE — TELEPHONE ENCOUNTER
PRIOR AUTHORIZATION DENIED    Medication: sildenafil (VIAGRA) 100 MG tablet-PA DENIED     Denial Date: 7/19/2023    Denial Rational:         Appeal Information:

## 2023-07-21 DIAGNOSIS — G47.00 INSOMNIA, UNSPECIFIED TYPE: ICD-10-CM

## 2023-07-24 RX ORDER — ZOLPIDEM TARTRATE 10 MG/1
TABLET ORAL
Qty: 30 TABLET | Refills: 0 | Status: SHIPPED | OUTPATIENT
Start: 2023-07-24 | End: 2023-09-11

## 2023-07-24 NOTE — TELEPHONE ENCOUNTER
Routing refill request to provider for review/approval because:    Requested Prescriptions   Pending Prescriptions Disp Refills    zolpidem (AMBIEN) 10 MG tablet [Pharmacy Med Name: ZOLPIDEM TARTRATE 10MG TABS] 30 tablet 0     Sig: TAKE ONE-HALF TO ONE TABLET BY MOUTH EVERY NIGHT AT BEDTIME AS NEEDED       There is no refill protocol information for this order

## 2023-09-10 DIAGNOSIS — G47.00 INSOMNIA, UNSPECIFIED TYPE: ICD-10-CM

## 2023-09-11 RX ORDER — ZOLPIDEM TARTRATE 10 MG/1
TABLET ORAL
Qty: 30 TABLET | Refills: 0 | Status: SHIPPED | OUTPATIENT
Start: 2023-09-11 | End: 2023-11-02

## 2023-11-02 DIAGNOSIS — G47.00 INSOMNIA, UNSPECIFIED TYPE: ICD-10-CM

## 2023-11-02 RX ORDER — ZOLPIDEM TARTRATE 10 MG/1
TABLET ORAL
Qty: 30 TABLET | Refills: 0 | Status: SHIPPED | OUTPATIENT
Start: 2023-11-02 | End: 2023-12-19

## 2023-11-03 ENCOUNTER — PATIENT OUTREACH (OUTPATIENT)
Dept: CARE COORDINATION | Facility: CLINIC | Age: 48
End: 2023-11-03
Payer: COMMERCIAL

## 2023-11-17 ENCOUNTER — PATIENT OUTREACH (OUTPATIENT)
Dept: CARE COORDINATION | Facility: CLINIC | Age: 48
End: 2023-11-17
Payer: COMMERCIAL

## 2023-12-18 DIAGNOSIS — G47.00 INSOMNIA, UNSPECIFIED TYPE: ICD-10-CM

## 2023-12-19 RX ORDER — ZOLPIDEM TARTRATE 10 MG/1
TABLET ORAL
Qty: 30 TABLET | Refills: 0 | Status: SHIPPED | OUTPATIENT
Start: 2023-12-19 | End: 2024-02-09

## 2024-02-09 DIAGNOSIS — G47.00 INSOMNIA, UNSPECIFIED TYPE: ICD-10-CM

## 2024-02-09 RX ORDER — ZOLPIDEM TARTRATE 10 MG/1
TABLET ORAL
Qty: 30 TABLET | Refills: 0 | Status: SHIPPED | OUTPATIENT
Start: 2024-02-09 | End: 2024-04-02

## 2024-03-24 DIAGNOSIS — N52.9 IMPOTENCE OF ORGANIC ORIGIN: ICD-10-CM

## 2024-03-25 RX ORDER — SILDENAFIL 100 MG/1
TABLET, FILM COATED ORAL
Qty: 20 TABLET | Refills: 0 | Status: SHIPPED | OUTPATIENT
Start: 2024-03-25 | End: 2024-05-17

## 2024-03-25 RX ORDER — SILDENAFIL 100 MG/1
TABLET, FILM COATED ORAL
Qty: 10 TABLET | Refills: 4 | OUTPATIENT
Start: 2024-03-25

## 2024-04-01 DIAGNOSIS — G47.00 INSOMNIA, UNSPECIFIED TYPE: ICD-10-CM

## 2024-04-02 RX ORDER — ZOLPIDEM TARTRATE 10 MG/1
TABLET ORAL
Qty: 30 TABLET | Refills: 0 | Status: SHIPPED | OUTPATIENT
Start: 2024-04-02 | End: 2024-05-17

## 2024-04-19 DIAGNOSIS — I10 BENIGN ESSENTIAL HYPERTENSION: ICD-10-CM

## 2024-04-19 RX ORDER — LISINOPRIL 10 MG/1
TABLET ORAL
Qty: 90 TABLET | Refills: 3 | Status: SHIPPED | OUTPATIENT
Start: 2024-04-19

## 2024-05-24 ENCOUNTER — OFFICE VISIT (OUTPATIENT)
Dept: INTERNAL MEDICINE | Facility: CLINIC | Age: 49
End: 2024-05-24
Payer: COMMERCIAL

## 2024-05-24 VITALS
DIASTOLIC BLOOD PRESSURE: 84 MMHG | WEIGHT: 202 LBS | RESPIRATION RATE: 16 BRPM | TEMPERATURE: 97.4 F | HEART RATE: 110 BPM | SYSTOLIC BLOOD PRESSURE: 118 MMHG | OXYGEN SATURATION: 99 % | BODY MASS INDEX: 34.49 KG/M2 | HEIGHT: 64 IN

## 2024-05-24 DIAGNOSIS — E66.01 MORBID OBESITY (H): ICD-10-CM

## 2024-05-24 DIAGNOSIS — I10 BENIGN ESSENTIAL HYPERTENSION: ICD-10-CM

## 2024-05-24 DIAGNOSIS — Z11.59 NEED FOR HEPATITIS C SCREENING TEST: ICD-10-CM

## 2024-05-24 DIAGNOSIS — Z12.5 SCREENING FOR PROSTATE CANCER: ICD-10-CM

## 2024-05-24 DIAGNOSIS — Z11.4 SCREENING FOR HIV (HUMAN IMMUNODEFICIENCY VIRUS): ICD-10-CM

## 2024-05-24 DIAGNOSIS — E78.5 HYPERLIPIDEMIA LDL GOAL <130: ICD-10-CM

## 2024-05-24 DIAGNOSIS — Z00.00 ROUTINE GENERAL MEDICAL EXAMINATION AT A HEALTH CARE FACILITY: Primary | ICD-10-CM

## 2024-05-24 LAB
ALBUMIN SERPL BCG-MCNC: 4.5 G/DL (ref 3.5–5.2)
ALP SERPL-CCNC: 115 U/L (ref 40–150)
ALT SERPL W P-5'-P-CCNC: 36 U/L (ref 0–70)
ANION GAP SERPL CALCULATED.3IONS-SCNC: 12 MMOL/L (ref 7–15)
AST SERPL W P-5'-P-CCNC: 40 U/L (ref 0–45)
BILIRUB SERPL-MCNC: 0.3 MG/DL
BUN SERPL-MCNC: 13.4 MG/DL (ref 6–20)
CALCIUM SERPL-MCNC: 10.2 MG/DL (ref 8.6–10)
CHLORIDE SERPL-SCNC: 105 MMOL/L (ref 98–107)
CHOLEST SERPL-MCNC: 195 MG/DL
CREAT SERPL-MCNC: 1.11 MG/DL (ref 0.67–1.17)
DEPRECATED HCO3 PLAS-SCNC: 20 MMOL/L (ref 22–29)
EGFRCR SERPLBLD CKD-EPI 2021: 81 ML/MIN/1.73M2
ERYTHROCYTE [DISTWIDTH] IN BLOOD BY AUTOMATED COUNT: 18.3 % (ref 10–15)
FASTING STATUS PATIENT QL REPORTED: NO
FASTING STATUS PATIENT QL REPORTED: NO
GLUCOSE SERPL-MCNC: 91 MG/DL (ref 70–99)
HCT VFR BLD AUTO: 41.4 % (ref 40–53)
HDLC SERPL-MCNC: 52 MG/DL
HGB BLD-MCNC: 13.2 G/DL (ref 13.3–17.7)
LDLC SERPL CALC-MCNC: 104 MG/DL
MCH RBC QN AUTO: 22.8 PG (ref 26.5–33)
MCHC RBC AUTO-ENTMCNC: 31.9 G/DL (ref 31.5–36.5)
MCV RBC AUTO: 72 FL (ref 78–100)
NONHDLC SERPL-MCNC: 143 MG/DL
PLATELET # BLD AUTO: 364 10E3/UL (ref 150–450)
POTASSIUM SERPL-SCNC: 4.4 MMOL/L (ref 3.4–5.3)
PROT SERPL-MCNC: 7.5 G/DL (ref 6.4–8.3)
PSA SERPL DL<=0.01 NG/ML-MCNC: 4.46 NG/ML (ref 0–3.5)
RBC # BLD AUTO: 5.78 10E6/UL (ref 4.4–5.9)
SODIUM SERPL-SCNC: 137 MMOL/L (ref 135–145)
TRIGL SERPL-MCNC: 197 MG/DL
WBC # BLD AUTO: 10.4 10E3/UL (ref 4–11)

## 2024-05-24 PROCEDURE — 80053 COMPREHEN METABOLIC PANEL: CPT | Performed by: INTERNAL MEDICINE

## 2024-05-24 PROCEDURE — 80061 LIPID PANEL: CPT | Performed by: INTERNAL MEDICINE

## 2024-05-24 PROCEDURE — G0103 PSA SCREENING: HCPCS | Performed by: INTERNAL MEDICINE

## 2024-05-24 PROCEDURE — 86803 HEPATITIS C AB TEST: CPT | Performed by: INTERNAL MEDICINE

## 2024-05-24 PROCEDURE — 99396 PREV VISIT EST AGE 40-64: CPT | Performed by: INTERNAL MEDICINE

## 2024-05-24 PROCEDURE — 36415 COLL VENOUS BLD VENIPUNCTURE: CPT | Performed by: INTERNAL MEDICINE

## 2024-05-24 PROCEDURE — 85027 COMPLETE CBC AUTOMATED: CPT | Performed by: INTERNAL MEDICINE

## 2024-05-24 PROCEDURE — 87389 HIV-1 AG W/HIV-1&-2 AB AG IA: CPT | Performed by: INTERNAL MEDICINE

## 2024-05-24 SDOH — HEALTH STABILITY: PHYSICAL HEALTH: ON AVERAGE, HOW MANY DAYS PER WEEK DO YOU ENGAGE IN MODERATE TO STRENUOUS EXERCISE (LIKE A BRISK WALK)?: 3 DAYS

## 2024-05-24 ASSESSMENT — SOCIAL DETERMINANTS OF HEALTH (SDOH): HOW OFTEN DO YOU GET TOGETHER WITH FRIENDS OR RELATIVES?: ONCE A WEEK

## 2024-05-24 NOTE — PROGRESS NOTES
"Preventive Care Visit  [facility]  Tyrell Zhong DO, Internal Medicine  May 24, 2024      Assessment & Plan     1. Routine general medical examination at a health care facility      2. Benign essential hypertension    - CBC with platelets; Future  - Comprehensive metabolic panel (BMP + Alb, Alk Phos, ALT, AST, Total. Bili, TP); Future  - CBC with platelets  - Comprehensive metabolic panel (BMP + Alb, Alk Phos, ALT, AST, Total. Bili, TP)    3. Hyperlipidemia LDL goal <130    - Lipid panel reflex to direct LDL Fasting; Future  - Lipid panel reflex to direct LDL Fasting    4. Morbid obesity (H)      5. Screening for prostate cancer    - PSA, screen; Future  - PSA, screen    6. Need for hepatitis C screening test    - Hepatitis C Screen Reflex to HCV RNA Quant and Genotype; Future  - Hepatitis C Screen Reflex to HCV RNA Quant and Genotype    7. Screening for HIV (human immunodeficiency virus)    - HIV Antigen Antibody Combo; Future  - HIV Antigen Antibody Combo        Patient has been advised of split billing requirements and indicates understanding: Yes        BMI  Estimated body mass index is 35.22 kg/m  as calculated from the following:    Height as of this encounter: 1.613 m (5' 3.5\").    Weight as of this encounter: 91.6 kg (202 lb).   Weight management plan: Discussed healthy diet and exercise guidelines    Counseling  Appropriate preventive services were discussed with this patient, including applicable screening as appropriate for fall prevention, nutrition, physical activity, Tobacco-use cessation, weight loss and cognition.  Checklist reviewing preventive services available has been given to the patient.  Reviewed patient's diet, addressing concerns and/or questions.   He is at risk for lack of exercise and has been provided with information to increase physical activity for the benefit of his well-being.   The patient was instructed to see the dentist every 6 " months.       MEDICATIONS:  Continue current medications without change  Work on weight loss  Regular exercise    Luisa Swan is a 48 year old, presenting for the following:  Physical        5/24/2024     2:26 PM   Additional Questions   Roomed by [facility] Care Directive  Patient does not have a Health Care Directive or Living Will: Patient states has Advance Directive and will bring in a copy to clinic.    HPI              5/24/2024   General Health   How would you rate your overall physical health? (!) FAIR   Feel stress (tense, anxious, or unable to sleep) Very much   (!) STRESS CONCERN      5/24/2024   Nutrition   Three or more servings of calcium each day? Yes   Diet: Regular (no restrictions)   How many servings of fruit and vegetables per day? (!) 0-1   How many sweetened beverages each day? 0-1         5/24/2024   Exercise   Days per week of moderate/strenous exercise 3 days         5/24/2024   Social Factors   Frequency of gathering with friends or relatives Once a week   Worry food won't last until get money to buy more No   Food not last or not have enough money for food? No   Do you have housing?  Yes   Are you worried about losing your housing? No   Lack of transportation? No   Unable to get utilities (heat,electricity)? No         5/24/2024   Fall Risk   Fallen 2 or more times in the past year? No   Trouble with walking or balance? No          5/24/2024   Dental   Dentist two times every year? (!) NO         5/24/2024   TB Screening   Were you born outside of the [place]? No         Today's PHQ-2 Score:       5/24/2024     2:17 PM   PHQ-2 ( 1999 [employer])   Q1: Little interest or pleasure in doing things 0   Q2: Feeling down, depressed or hopeless 0   PHQ-2 Score 0   Q1: Little interest or pleasure in doing things Not at all   Q2: Feeling down, depressed or hopeless Not at all   PHQ-2 Score 0           5/24/2024   Substance Use   Alcohol more than 3/day or more than 7/wk No   Do you use  any other substances recreationally? No     Social History     Tobacco Use    Smoking status: Never    Smokeless tobacco: Never    Tobacco comments:     no smokers in household   Substance Use Topics    Alcohol use: Yes    Drug use: No             5/24/2024   One time HIV Screening   Previous HIV test? No         5/24/2024   STI Screening   New sexual partner(s) since last STI/HIV test? No   ASCVD Risk   The 10-year ASCVD risk score (Jason RENEE, et al., 2019) is: 2.3%    Values used to calculate the score:      Age: 48 years      Sex: Male      Is Non- : No      Diabetic: No      Tobacco smoker: No      Systolic Blood Pressure: 118 mmHg      Is BP treated: Yes      HDL Cholesterol: 67 mg/dL      Total Cholesterol: 185 mg/dL            5/24/2024   Contraception/Family Planning   Questions about contraception or family planning No        Reviewed and updated as needed this visit by Provider                    Past Medical History:   Diagnosis Date    Bariatric surgery status      Past Surgical History:   Procedure Laterality Date    COLONOSCOPY N/A 12/8/2017    Procedure: COLONOSCOPY;  Colonoscopy, Combined Esophagoscopy, Gastroscopy, Duodenoscopy;  Surgeon: Richard Gomez MD;  Location: [facility]    ESOPHAGOSCOPY, GASTROSCOPY, DUODENOSCOPY (EGD), COMBINED N/A 12/8/2017    Procedure: COMBINED ESOPHAGOSCOPY, GASTROSCOPY, DUODENOSCOPY (EGD);;  Surgeon: Richard Gomez MD;  Location:  GI    GASTRIC BYPASS  2005     Lab work is in process  Labs reviewed in EPIC  BP Readings from Last 3 Encounters:   05/24/24 118/84   03/03/23 (!) 130/92   12/02/22 128/88    Wt Readings from Last 3 Encounters:   05/24/24 91.6 kg (202 lb)   03/03/23 91.8 kg (202 lb 4.8 oz)   12/02/22 88.5 kg (195 lb)                  Patient Active Problem List   Diagnosis    Bariatric surgery status    Wrist tendonitis    CARDIOVASCULAR SCREENING; LDL GOAL LESS THAN 160    Erectile dysfunction    Onychomycosis    Iron  deficiency anemia, unspecified iron deficiency anemia type    Benign essential hypertension    Morbid obesity (H)     Past Surgical History:   Procedure Laterality Date    COLONOSCOPY N/A 12/8/2017    Procedure: COLONOSCOPY;  Colonoscopy, Combined Esophagoscopy, Gastroscopy, Duodenoscopy;  Surgeon: Richard Gomez MD;  Location: [facility]    ESOPHAGOSCOPY, GASTROSCOPY, DUODENOSCOPY (EGD), COMBINED N/A 12/8/2017    Procedure: COMBINED ESOPHAGOSCOPY, GASTROSCOPY, DUODENOSCOPY (EGD);;  Surgeon: Richard Gomez MD;  Location: [facility]    GASTRIC BYPASS  2005       Social History     Tobacco Use    Smoking status: Never    Smokeless tobacco: Never    Tobacco comments:     no smokers in household   Substance Use Topics    Alcohol use: Yes     Family History   Problem Relation Age of Onset    Diabetes Father     Cancer Paternal Grandfather          Current Outpatient Medications   Medication Sig Dispense Refill    Acetaminophen (TYLENOL PO)       ibuprofen (ADVIL/MOTRIN) 200 MG tablet Take 200 mg by mouth every 4 hours as needed.      lisinopril (ZESTRIL) 10 MG tablet TAKE ONE TABLET ( 10 MG) BY MOUTH ONCE DAILY 90 tablet 3    sildenafil (VIAGRA) 100 MG tablet TAKE 1/2 (ONE-HALF) TABLET BY MOUTH ONCE DAILY AS NEEDED 10 tablet 0    zolpidem (AMBIEN) 10 MG tablet TAKE ONE-HALF TO ONE TABLET BY MOUTH EVERY NIGHT AT BEDTIME AS NEEDED (NEED TO BE SEEN IN CLINIC FOR FURTHER REFILLS) 15 tablet 0     No Known Allergies      Review of Systems  CONSTITUTIONAL: NEGATIVE for fever, chills, change in weight  INTEGUMENTARY/SKIN: NEGATIVE for worrisome rashes, moles or lesions  EYES: NEGATIVE for vision changes or irritation  ENT/MOUTH: NEGATIVE for ear, mouth and throat problems  RESP: NEGATIVE for significant cough or SOB  BREAST: NEGATIVE for masses, tenderness or discharge  CV: NEGATIVE for chest pain, palpitations or peripheral edema  GI: NEGATIVE for nausea, abdominal pain, heartburn, or change in bowel habits  : NEGATIVE for  "frequency, dysuria, or hematuria  MUSCULOSKELETAL: NEGATIVE for significant arthralgias or myalgia  NEURO: NEGATIVE for weakness, dizziness or paresthesias  ENDOCRINE: NEGATIVE for temperature intolerance, skin/hair changes  HEME: NEGATIVE for bleeding problems  PSYCHIATRIC: NEGATIVE for changes in mood or affect     Objective    Exam  /84   Pulse 110   Temp 97.4  F (36.3  C) (Temporal)   Resp 16   Ht 1.613 m (5' 3.5\")   Wt 91.6 kg (202 lb)   SpO2 99%   BMI 35.22 kg/m     Estimated body mass index is 35.22 kg/m  as calculated from the following:    Height as of this encounter: 1.613 m (5' 3.5\").    Weight as of this encounter: 91.6 kg (202 lb).    Physical Exam  GENERAL: alert and no distress  EYES: Eyes grossly normal to inspection, PERRL and conjunctivae and sclerae normal  HENT: ear canals and TM's normal, nose and mouth without ulcers or lesions  NECK: no adenopathy, no asymmetry, masses, or scars  RESP: lungs clear to auscultation - no rales, rhonchi or wheezes  CV: regular rate and rhythm, normal S1 S2, no S3 or S4, no murmur, click or rub, no peripheral edema  ABDOMEN: soft, nontender, no hepatosplenomegaly, no masses and bowel sounds normal  MS: no gross musculoskeletal defects noted, no edema  SKIN: no suspicious lesions or rashes  NEURO: Normal strength and tone, mentation intact and speech normal  PSYCH: mentation appears normal, affect normal/bright        I have reviewed the patient's vaccination schedule and discussed the benefits of prophylactic vaccination in detail.  I recommend the patient contact their pharmacist for vaccinations.  Discussed that most insurance companies now favor reimbursement to the pharmacies and it will financially behoove the patient to have vaccinations performed at their pharmacy.        Signed Electronically by: Tyrell Zhong DO    "

## 2024-05-25 LAB
HCV AB SERPL QL IA: NONREACTIVE
HIV 1+2 AB+HIV1 P24 AG SERPL QL IA: NONREACTIVE

## 2024-06-10 DIAGNOSIS — G47.00 INSOMNIA, UNSPECIFIED TYPE: ICD-10-CM

## 2024-06-12 RX ORDER — ZOLPIDEM TARTRATE 10 MG/1
TABLET ORAL
Qty: 15 TABLET | Refills: 0 | Status: SHIPPED | OUTPATIENT
Start: 2024-06-12 | End: 2024-07-10

## 2024-06-26 DIAGNOSIS — N52.9 IMPOTENCE OF ORGANIC ORIGIN: ICD-10-CM

## 2024-06-27 RX ORDER — SILDENAFIL 100 MG/1
TABLET, FILM COATED ORAL
Qty: 10 TABLET | Refills: 0 | Status: SHIPPED | OUTPATIENT
Start: 2024-06-27 | End: 2024-08-05

## 2024-07-05 ENCOUNTER — ANESTHESIA EVENT (OUTPATIENT)
Dept: SURGERY | Facility: CLINIC | Age: 49
End: 2024-07-05
Payer: COMMERCIAL

## 2024-07-05 ENCOUNTER — HOSPITAL ENCOUNTER (OUTPATIENT)
Facility: CLINIC | Age: 49
End: 2024-07-05
Attending: SURGERY | Admitting: SURGERY
Payer: COMMERCIAL

## 2024-07-05 ENCOUNTER — HOSPITAL ENCOUNTER (OUTPATIENT)
Facility: CLINIC | Age: 49
Discharge: HOME | End: 2024-07-05
Attending: EMERGENCY MEDICINE | Admitting: EMERGENCY MEDICINE
Payer: COMMERCIAL

## 2024-07-05 ENCOUNTER — APPOINTMENT (OUTPATIENT)
Dept: CT IMAGING | Facility: CLINIC | Age: 49
End: 2024-07-05
Attending: EMERGENCY MEDICINE
Payer: COMMERCIAL

## 2024-07-05 ENCOUNTER — ANESTHESIA (OUTPATIENT)
Dept: SURGERY | Facility: CLINIC | Age: 49
End: 2024-07-05
Payer: COMMERCIAL

## 2024-07-05 VITALS
DIASTOLIC BLOOD PRESSURE: 78 MMHG | OXYGEN SATURATION: 94 % | WEIGHT: 202 LBS | HEIGHT: 64 IN | SYSTOLIC BLOOD PRESSURE: 126 MMHG | BODY MASS INDEX: 34.49 KG/M2 | RESPIRATION RATE: 16 BRPM | TEMPERATURE: 99.1 F | HEART RATE: 102 BPM

## 2024-07-05 DIAGNOSIS — K35.30 ACUTE APPENDICITIS WITH LOCALIZED PERITONITIS, WITHOUT PERFORATION, ABSCESS, OR GANGRENE: ICD-10-CM

## 2024-07-05 DIAGNOSIS — Z90.49 S/P LAPAROSCOPIC APPENDECTOMY: Primary | ICD-10-CM

## 2024-07-05 LAB
ALBUMIN SERPL BCG-MCNC: 4.1 G/DL (ref 3.5–5.2)
ALP SERPL-CCNC: 110 U/L (ref 40–150)
ALT SERPL W P-5'-P-CCNC: 27 U/L (ref 0–70)
ANION GAP SERPL CALCULATED.3IONS-SCNC: 11 MMOL/L (ref 7–15)
AST SERPL W P-5'-P-CCNC: 22 U/L (ref 0–45)
BASOPHILS # BLD MANUAL: 0.2 10E3/UL (ref 0–0.2)
BASOPHILS NFR BLD MANUAL: 1 %
BILIRUB SERPL-MCNC: 0.5 MG/DL
BUN SERPL-MCNC: 9.5 MG/DL (ref 6–20)
CALCIUM SERPL-MCNC: 9.5 MG/DL (ref 8.6–10)
CHLORIDE SERPL-SCNC: 99 MMOL/L (ref 98–107)
CREAT SERPL-MCNC: 1.26 MG/DL (ref 0.67–1.17)
DACRYOCYTES BLD QL SMEAR: SLIGHT
DEPRECATED HCO3 PLAS-SCNC: 24 MMOL/L (ref 22–29)
EGFRCR SERPLBLD CKD-EPI 2021: 69 ML/MIN/1.73M2
EOSINOPHIL # BLD MANUAL: 0.2 10E3/UL (ref 0–0.7)
EOSINOPHIL NFR BLD MANUAL: 1 %
ERYTHROCYTE [DISTWIDTH] IN BLOOD BY AUTOMATED COUNT: 18.2 % (ref 10–15)
GLUCOSE SERPL-MCNC: 102 MG/DL (ref 70–99)
HCT VFR BLD AUTO: 40.3 % (ref 40–53)
HGB BLD-MCNC: 13 G/DL (ref 13.3–17.7)
LYMPHOCYTES # BLD MANUAL: 1.2 10E3/UL (ref 0.8–5.3)
LYMPHOCYTES NFR BLD MANUAL: 5 %
MCH RBC QN AUTO: 22.7 PG (ref 26.5–33)
MCHC RBC AUTO-ENTMCNC: 32.3 G/DL (ref 31.5–36.5)
MCV RBC AUTO: 70 FL (ref 78–100)
MONOCYTES # BLD MANUAL: 1.5 10E3/UL (ref 0–1.3)
MONOCYTES NFR BLD MANUAL: 6 %
NEUTROPHILS # BLD MANUAL: 21.1 10E3/UL (ref 1.6–8.3)
NEUTROPHILS NFR BLD MANUAL: 87 %
NRBC # BLD AUTO: 0 10E3/UL
NRBC BLD AUTO-RTO: 0 /100
PLAT MORPH BLD: ABNORMAL
PLATELET # BLD AUTO: 300 10E3/UL (ref 150–450)
POTASSIUM SERPL-SCNC: 4.4 MMOL/L (ref 3.4–5.3)
PROT SERPL-MCNC: 7.3 G/DL (ref 6.4–8.3)
RBC # BLD AUTO: 5.73 10E6/UL (ref 4.4–5.9)
RBC MORPH BLD: ABNORMAL
SODIUM SERPL-SCNC: 134 MMOL/L (ref 135–145)
WBC # BLD AUTO: 24.3 10E3/UL (ref 4–11)

## 2024-07-05 PROCEDURE — 250N000013 HC RX MED GY IP 250 OP 250 PS 637: Performed by: SURGERY

## 2024-07-05 PROCEDURE — 250N000009 HC RX 250: Performed by: EMERGENCY MEDICINE

## 2024-07-05 PROCEDURE — 710N000010 HC RECOVERY PHASE 1, LEVEL 2, PER MIN: Performed by: SURGERY

## 2024-07-05 PROCEDURE — 370N000017 HC ANESTHESIA TECHNICAL FEE, PER MIN: Performed by: SURGERY

## 2024-07-05 PROCEDURE — 85007 BL SMEAR W/DIFF WBC COUNT: CPT | Performed by: EMERGENCY MEDICINE

## 2024-07-05 PROCEDURE — 82374 ASSAY BLOOD CARBON DIOXIDE: CPT | Performed by: EMERGENCY MEDICINE

## 2024-07-05 PROCEDURE — 44970 LAPAROSCOPY APPENDECTOMY: CPT | Performed by: SURGERY

## 2024-07-05 PROCEDURE — 99204 OFFICE O/P NEW MOD 45 MIN: CPT | Mod: 57 | Performed by: SURGERY

## 2024-07-05 PROCEDURE — 250N000026 HC DESFLURANE, PER MIN: Performed by: SURGERY

## 2024-07-05 PROCEDURE — 250N000009 HC RX 250: Performed by: NURSE ANESTHETIST, CERTIFIED REGISTERED

## 2024-07-05 PROCEDURE — 99285 EMERGENCY DEPT VISIT HI MDM: CPT | Performed by: EMERGENCY MEDICINE

## 2024-07-05 PROCEDURE — 96365 THER/PROPH/DIAG IV INF INIT: CPT | Mod: 59 | Performed by: EMERGENCY MEDICINE

## 2024-07-05 PROCEDURE — 250N000009 HC RX 250: Performed by: SURGERY

## 2024-07-05 PROCEDURE — 258N000003 HC RX IP 258 OP 636: Performed by: EMERGENCY MEDICINE

## 2024-07-05 PROCEDURE — 710N000012 HC RECOVERY PHASE 2, PER MINUTE: Performed by: SURGERY

## 2024-07-05 PROCEDURE — 96361 HYDRATE IV INFUSION ADD-ON: CPT | Performed by: EMERGENCY MEDICINE

## 2024-07-05 PROCEDURE — 82040 ASSAY OF SERUM ALBUMIN: CPT | Performed by: EMERGENCY MEDICINE

## 2024-07-05 PROCEDURE — 250N000011 HC RX IP 250 OP 636: Performed by: EMERGENCY MEDICINE

## 2024-07-05 PROCEDURE — 85004 AUTOMATED DIFF WBC COUNT: CPT | Performed by: EMERGENCY MEDICINE

## 2024-07-05 PROCEDURE — 258N000003 HC RX IP 258 OP 636: Performed by: NURSE ANESTHETIST, CERTIFIED REGISTERED

## 2024-07-05 PROCEDURE — 88304 TISSUE EXAM BY PATHOLOGIST: CPT | Mod: TC | Performed by: SURGERY

## 2024-07-05 PROCEDURE — 36415 COLL VENOUS BLD VENIPUNCTURE: CPT | Performed by: EMERGENCY MEDICINE

## 2024-07-05 PROCEDURE — 360N000076 HC SURGERY LEVEL 3, PER MIN: Performed by: SURGERY

## 2024-07-05 PROCEDURE — 88304 TISSUE EXAM BY PATHOLOGIST: CPT | Mod: 26 | Performed by: PATHOLOGY

## 2024-07-05 PROCEDURE — 74177 CT ABD & PELVIS W/CONTRAST: CPT

## 2024-07-05 PROCEDURE — 272N000001 HC OR GENERAL SUPPLY STERILE: Performed by: SURGERY

## 2024-07-05 PROCEDURE — 96375 TX/PRO/DX INJ NEW DRUG ADDON: CPT | Performed by: EMERGENCY MEDICINE

## 2024-07-05 PROCEDURE — 999N000141 HC STATISTIC PRE-PROCEDURE NURSING ASSESSMENT: Performed by: SURGERY

## 2024-07-05 PROCEDURE — 99285 EMERGENCY DEPT VISIT HI MDM: CPT | Mod: 25 | Performed by: EMERGENCY MEDICINE

## 2024-07-05 PROCEDURE — 250N000011 HC RX IP 250 OP 636: Performed by: NURSE ANESTHETIST, CERTIFIED REGISTERED

## 2024-07-05 RX ORDER — HYDROMORPHONE HYDROCHLORIDE 1 MG/ML
0.2 INJECTION, SOLUTION INTRAMUSCULAR; INTRAVENOUS; SUBCUTANEOUS EVERY 5 MIN PRN
Status: DISCONTINUED | OUTPATIENT
Start: 2024-07-05 | End: 2024-07-05 | Stop reason: HOSPADM

## 2024-07-05 RX ORDER — NALOXONE HYDROCHLORIDE 0.4 MG/ML
0.1 INJECTION, SOLUTION INTRAMUSCULAR; INTRAVENOUS; SUBCUTANEOUS
Status: DISCONTINUED | OUTPATIENT
Start: 2024-07-05 | End: 2024-07-05 | Stop reason: HOSPADM

## 2024-07-05 RX ORDER — ASPIRIN 325 MG
650 TABLET, DELAYED RELEASE (ENTERIC COATED) ORAL DAILY
COMMUNITY

## 2024-07-05 RX ORDER — SODIUM CHLORIDE, SODIUM LACTATE, POTASSIUM CHLORIDE, CALCIUM CHLORIDE 600; 310; 30; 20 MG/100ML; MG/100ML; MG/100ML; MG/100ML
INJECTION, SOLUTION INTRAVENOUS CONTINUOUS
Status: DISCONTINUED | OUTPATIENT
Start: 2024-07-05 | End: 2024-07-05 | Stop reason: HOSPADM

## 2024-07-05 RX ORDER — OXYCODONE AND ACETAMINOPHEN 5; 325 MG/1; MG/1
2 TABLET ORAL
Status: COMPLETED | OUTPATIENT
Start: 2024-07-05 | End: 2024-07-05

## 2024-07-05 RX ORDER — ONDANSETRON 2 MG/ML
4 INJECTION INTRAMUSCULAR; INTRAVENOUS EVERY 30 MIN PRN
Status: DISCONTINUED | OUTPATIENT
Start: 2024-07-05 | End: 2024-07-05 | Stop reason: HOSPADM

## 2024-07-05 RX ORDER — ALBUTEROL SULFATE 0.83 MG/ML
2.5 SOLUTION RESPIRATORY (INHALATION) EVERY 4 HOURS PRN
Status: DISCONTINUED | OUTPATIENT
Start: 2024-07-05 | End: 2024-07-05 | Stop reason: HOSPADM

## 2024-07-05 RX ORDER — PROPOFOL 10 MG/ML
INJECTION, EMULSION INTRAVENOUS PRN
Status: DISCONTINUED | OUTPATIENT
Start: 2024-07-05 | End: 2024-07-05

## 2024-07-05 RX ORDER — HYDROMORPHONE HYDROCHLORIDE 1 MG/ML
0.5 INJECTION, SOLUTION INTRAMUSCULAR; INTRAVENOUS; SUBCUTANEOUS
Status: COMPLETED | OUTPATIENT
Start: 2024-07-05 | End: 2024-07-05

## 2024-07-05 RX ORDER — CEFAZOLIN SODIUM/WATER 2 G/20 ML
2 SYRINGE (ML) INTRAVENOUS SEE ADMIN INSTRUCTIONS
Status: DISCONTINUED | OUTPATIENT
Start: 2024-07-05 | End: 2024-07-05 | Stop reason: HOSPADM

## 2024-07-05 RX ORDER — CEFAZOLIN SODIUM/WATER 2 G/20 ML
SYRINGE (ML) INTRAVENOUS PRN
Status: DISCONTINUED | OUTPATIENT
Start: 2024-07-05 | End: 2024-07-05

## 2024-07-05 RX ORDER — DEXAMETHASONE SODIUM PHOSPHATE 10 MG/ML
4 INJECTION, SOLUTION INTRAMUSCULAR; INTRAVENOUS
Status: DISCONTINUED | OUTPATIENT
Start: 2024-07-05 | End: 2024-07-05 | Stop reason: HOSPADM

## 2024-07-05 RX ORDER — FENTANYL CITRATE 50 UG/ML
INJECTION, SOLUTION INTRAMUSCULAR; INTRAVENOUS PRN
Status: DISCONTINUED | OUTPATIENT
Start: 2024-07-05 | End: 2024-07-05

## 2024-07-05 RX ORDER — AMPICILLIN AND SULBACTAM 2; 1 G/1; G/1
3 INJECTION, POWDER, FOR SOLUTION INTRAMUSCULAR; INTRAVENOUS ONCE
Status: COMPLETED | OUTPATIENT
Start: 2024-07-05 | End: 2024-07-05

## 2024-07-05 RX ORDER — CEFAZOLIN SODIUM/WATER 2 G/20 ML
2 SYRINGE (ML) INTRAVENOUS
Status: DISCONTINUED | OUTPATIENT
Start: 2024-07-05 | End: 2024-07-05 | Stop reason: HOSPADM

## 2024-07-05 RX ORDER — ONDANSETRON 2 MG/ML
INJECTION INTRAMUSCULAR; INTRAVENOUS PRN
Status: DISCONTINUED | OUTPATIENT
Start: 2024-07-05 | End: 2024-07-05

## 2024-07-05 RX ORDER — FENTANYL CITRATE 50 UG/ML
50 INJECTION, SOLUTION INTRAMUSCULAR; INTRAVENOUS EVERY 5 MIN PRN
Status: DISCONTINUED | OUTPATIENT
Start: 2024-07-05 | End: 2024-07-05 | Stop reason: HOSPADM

## 2024-07-05 RX ORDER — LIDOCAINE HYDROCHLORIDE 20 MG/ML
INJECTION, SOLUTION INFILTRATION; PERINEURAL PRN
Status: DISCONTINUED | OUTPATIENT
Start: 2024-07-05 | End: 2024-07-05

## 2024-07-05 RX ORDER — HYDROMORPHONE HYDROCHLORIDE 1 MG/ML
0.5 INJECTION, SOLUTION INTRAMUSCULAR; INTRAVENOUS; SUBCUTANEOUS EVERY 5 MIN PRN
Status: DISCONTINUED | OUTPATIENT
Start: 2024-07-05 | End: 2024-07-05 | Stop reason: HOSPADM

## 2024-07-05 RX ORDER — BUPIVACAINE HYDROCHLORIDE AND EPINEPHRINE 2.5; 5 MG/ML; UG/ML
INJECTION, SOLUTION INFILTRATION; PERINEURAL PRN
Status: DISCONTINUED | OUTPATIENT
Start: 2024-07-05 | End: 2024-07-05 | Stop reason: HOSPADM

## 2024-07-05 RX ORDER — MEPERIDINE HYDROCHLORIDE 25 MG/ML
12.5 INJECTION INTRAMUSCULAR; INTRAVENOUS; SUBCUTANEOUS EVERY 5 MIN PRN
Status: DISCONTINUED | OUTPATIENT
Start: 2024-07-05 | End: 2024-07-05 | Stop reason: HOSPADM

## 2024-07-05 RX ORDER — ONDANSETRON 4 MG/1
4 TABLET, ORALLY DISINTEGRATING ORAL EVERY 30 MIN PRN
Status: DISCONTINUED | OUTPATIENT
Start: 2024-07-05 | End: 2024-07-05 | Stop reason: HOSPADM

## 2024-07-05 RX ORDER — OXYCODONE AND ACETAMINOPHEN 5; 325 MG/1; MG/1
1-2 TABLET ORAL EVERY 4 HOURS PRN
Qty: 12 TABLET | Refills: 0 | Status: SHIPPED | OUTPATIENT
Start: 2024-07-05

## 2024-07-05 RX ORDER — IOPAMIDOL 755 MG/ML
500 INJECTION, SOLUTION INTRAVASCULAR ONCE
Status: COMPLETED | OUTPATIENT
Start: 2024-07-05 | End: 2024-07-05

## 2024-07-05 RX ORDER — FENTANYL CITRATE 50 UG/ML
25 INJECTION, SOLUTION INTRAMUSCULAR; INTRAVENOUS EVERY 5 MIN PRN
Status: DISCONTINUED | OUTPATIENT
Start: 2024-07-05 | End: 2024-07-05 | Stop reason: HOSPADM

## 2024-07-05 RX ORDER — DEXAMETHASONE SODIUM PHOSPHATE 10 MG/ML
INJECTION, SOLUTION INTRAMUSCULAR; INTRAVENOUS PRN
Status: DISCONTINUED | OUTPATIENT
Start: 2024-07-05 | End: 2024-07-05

## 2024-07-05 RX ORDER — LABETALOL HYDROCHLORIDE 5 MG/ML
10 INJECTION, SOLUTION INTRAVENOUS
Status: DISCONTINUED | OUTPATIENT
Start: 2024-07-05 | End: 2024-07-05 | Stop reason: HOSPADM

## 2024-07-05 RX ORDER — KETOROLAC TROMETHAMINE 30 MG/ML
30 INJECTION, SOLUTION INTRAMUSCULAR; INTRAVENOUS ONCE
Status: COMPLETED | OUTPATIENT
Start: 2024-07-05 | End: 2024-07-05

## 2024-07-05 RX ADMIN — HYDROMORPHONE HYDROCHLORIDE 0.5 MG: 1 INJECTION, SOLUTION INTRAMUSCULAR; INTRAVENOUS; SUBCUTANEOUS at 13:36

## 2024-07-05 RX ADMIN — SODIUM CHLORIDE 60 ML: 9 INJECTION, SOLUTION INTRAVENOUS at 10:32

## 2024-07-05 RX ADMIN — IOPAMIDOL 100 ML: 755 INJECTION, SOLUTION INTRAVENOUS at 10:33

## 2024-07-05 RX ADMIN — OXYCODONE HYDROCHLORIDE AND ACETAMINOPHEN 2 TABLET: 5; 325 TABLET ORAL at 15:48

## 2024-07-05 RX ADMIN — MIDAZOLAM 2 MG: 1 INJECTION INTRAMUSCULAR; INTRAVENOUS at 13:22

## 2024-07-05 RX ADMIN — SUGAMMADEX 200 MG: 100 INJECTION, SOLUTION INTRAVENOUS at 14:13

## 2024-07-05 RX ADMIN — ROCURONIUM BROMIDE 50 MG: 50 INJECTION, SOLUTION INTRAVENOUS at 13:30

## 2024-07-05 RX ADMIN — PROPOFOL 200 MG: 10 INJECTION, EMULSION INTRAVENOUS at 13:30

## 2024-07-05 RX ADMIN — FENTANYL CITRATE 50 MCG: 50 INJECTION INTRAMUSCULAR; INTRAVENOUS at 13:29

## 2024-07-05 RX ADMIN — ONDANSETRON 4 MG: 2 INJECTION INTRAMUSCULAR; INTRAVENOUS at 14:13

## 2024-07-05 RX ADMIN — SODIUM CHLORIDE, POTASSIUM CHLORIDE, SODIUM LACTATE AND CALCIUM CHLORIDE: 600; 310; 30; 20 INJECTION, SOLUTION INTRAVENOUS at 13:23

## 2024-07-05 RX ADMIN — Medication 2 G: at 13:18

## 2024-07-05 RX ADMIN — SODIUM CHLORIDE 1000 ML: 9 INJECTION, SOLUTION INTRAVENOUS at 10:22

## 2024-07-05 RX ADMIN — DEXAMETHASONE SODIUM PHOSPHATE 5 MG: 10 INJECTION, SOLUTION INTRAMUSCULAR; INTRAVENOUS at 13:36

## 2024-07-05 RX ADMIN — LIDOCAINE HYDROCHLORIDE 50 MG: 20 INJECTION, SOLUTION INFILTRATION; PERINEURAL at 13:30

## 2024-07-05 RX ADMIN — FENTANYL CITRATE 50 MCG: 50 INJECTION INTRAMUSCULAR; INTRAVENOUS at 13:22

## 2024-07-05 RX ADMIN — KETOROLAC TROMETHAMINE 30 MG: 30 INJECTION, SOLUTION INTRAMUSCULAR at 10:24

## 2024-07-05 RX ADMIN — HYDROMORPHONE HYDROCHLORIDE 0.5 MG: 1 INJECTION, SOLUTION INTRAMUSCULAR; INTRAVENOUS; SUBCUTANEOUS at 12:11

## 2024-07-05 RX ADMIN — AMPICILLIN SODIUM AND SULBACTAM SODIUM 3 G: 2; 1 INJECTION, POWDER, FOR SOLUTION INTRAMUSCULAR; INTRAVENOUS at 11:03

## 2024-07-05 ASSESSMENT — ACTIVITIES OF DAILY LIVING (ADL)
ADLS_ACUITY_SCORE: 35
ADLS_ACUITY_SCORE: 33
ADLS_ACUITY_SCORE: 35

## 2024-07-05 ASSESSMENT — COLUMBIA-SUICIDE SEVERITY RATING SCALE - C-SSRS
1. IN THE PAST MONTH, HAVE YOU WISHED YOU WERE DEAD OR WISHED YOU COULD GO TO SLEEP AND NOT WAKE UP?: NO
6. HAVE YOU EVER DONE ANYTHING, STARTED TO DO ANYTHING, OR PREPARED TO DO ANYTHING TO END YOUR LIFE?: NO
2. HAVE YOU ACTUALLY HAD ANY THOUGHTS OF KILLING YOURSELF IN THE PAST MONTH?: NO

## 2024-07-05 NOTE — ED PROVIDER NOTES
History     Chief Complaint   Patient presents with    Abdominal Pain     HPI  Beny Nelson is a 49 year old male who presents with concern of lower abdominal pain.  He said the pain started suddenly on Wednesday evening.  Had been feeling fine for most of the day, but after eating dinner had sudden onset of pain in his lower abdomen.  Says that the pain is constant, but gets worse with certain movements and can feel very sharp.  Never fully goes away.  It does not radiate.  He does not feel pain down into his testicles.  Thought potentially he was constipated, so took some laxatives but did not have any relief.  Last bowel movement was Wednesday, and was nonbloody.  Is not having pain or burning with urination.  Has not been running a fever.  No vomiting.  Tried taking Advil at home without any relief.  Previous history of bariatric surgery.    Allergies:  No Known Allergies    Problem List:    Patient Active Problem List    Diagnosis Date Noted    Morbid obesity (H) 03/03/2023     Priority: Medium    Benign essential hypertension 01/29/2020     Priority: Medium    Iron deficiency anemia, unspecified iron deficiency anemia type 11/26/2017     Priority: Medium    Erectile dysfunction 03/14/2013     Priority: Medium    Onychomycosis 03/14/2013     Priority: Medium    CARDIOVASCULAR SCREENING; LDL GOAL LESS THAN 160 10/31/2010     Priority: Medium    Wrist tendonitis 02/09/2010     Priority: Medium     Left wrist      Bariatric surgery status      Priority: Medium        Past Medical History:    Past Medical History:   Diagnosis Date    Bariatric surgery status        Past Surgical History:    Past Surgical History:   Procedure Laterality Date    COLONOSCOPY N/A 12/8/2017    Procedure: COLONOSCOPY;  Colonoscopy, Combined Esophagoscopy, Gastroscopy, Duodenoscopy;  Surgeon: Richard Gomez MD;  Location:  GI    ESOPHAGOSCOPY, GASTROSCOPY, DUODENOSCOPY (EGD), COMBINED N/A 12/8/2017    Procedure: COMBINED  "ESOPHAGOSCOPY, GASTROSCOPY, DUODENOSCOPY (EGD);;  Surgeon: Richard Gomez MD;  Location:  GI    GASTRIC BYPASS  2005       Family History:    Family History   Problem Relation Age of Onset    Diabetes Father     Cancer Paternal Grandfather        Social History:  Marital Status:   [2]  Social History     Tobacco Use    Smoking status: Never    Smokeless tobacco: Never    Tobacco comments:     no smokers in household   Substance Use Topics    Alcohol use: Yes    Drug use: No        Medications:    Acetaminophen (TYLENOL PO)  ibuprofen (ADVIL/MOTRIN) 200 MG tablet  lisinopril (ZESTRIL) 10 MG tablet  sildenafil (VIAGRA) 100 MG tablet  zolpidem (AMBIEN) 10 MG tablet          Review of Systems   All other systems reviewed and are negative.      Physical Exam   BP: 113/80  Pulse: 91  Temp: 98.2  F (36.8  C)  Resp: 16  Height: 162.6 cm (5' 4\")  Weight: 91.6 kg (202 lb)  SpO2: 100 %      Physical Exam  Vitals and nursing note reviewed.   Constitutional:       Appearance: He is not diaphoretic.   Cardiovascular:      Rate and Rhythm: Normal rate.   Pulmonary:      Effort: Pulmonary effort is normal.   Abdominal:      General: Bowel sounds are normal.      Palpations: Abdomen is soft.      Tenderness: There is abdominal tenderness in the right lower quadrant, suprapubic area and left lower quadrant. There is guarding. There is no rebound.   Skin:     General: Skin is warm.   Neurological:      Mental Status: He is alert.         ED Course        Procedures              Critical Care time:  none               Results for orders placed or performed during the hospital encounter of 07/05/24 (from the past 24 hour(s))   CBC with platelets differential    Narrative    The following orders were created for panel order CBC with platelets differential.  Procedure                               Abnormality         Status                     ---------                               -----------         ------                  "    CBC with platelets and d...[[id number]]  Abnormal            Final result               Manual Differential[[id number]]          Abnormal            Final result                 Please view results for these tests on the individual orders.   Comprehensive metabolic panel   Result Value Ref Range    Sodium 134 (L) 135 - 145 mmol/L    Potassium 4.4 3.4 - 5.3 mmol/L    Carbon Dioxide (CO2) 24 22 - 29 mmol/L    Anion Gap 11 7 - 15 mmol/L    Urea Nitrogen 9.5 6.0 - 20.0 mg/dL    Creatinine 1.26 (H) 0.67 - 1.17 mg/dL    GFR Estimate 69 >60 mL/min/1.73m2    Calcium 9.5 8.6 - 10.0 mg/dL    Chloride 99 98 - 107 mmol/L    Glucose 102 (H) 70 - 99 mg/dL    Alkaline Phosphatase 110 40 - 150 U/L    AST 22 0 - 45 U/L    ALT 27 0 - 70 U/L    Protein Total 7.3 6.4 - 8.3 g/dL    Albumin 4.1 3.5 - 5.2 g/dL    Bilirubin Total 0.5 <=1.2 mg/dL   CBC with platelets and differential   Result Value Ref Range    WBC Count 24.3 (H) 4.0 - 11.0 10e3/uL    RBC Count 5.73 4.40 - 5.90 10e6/uL    Hemoglobin 13.0 (L) 13.3 - 17.7 g/dL    Hematocrit 40.3 40.0 - 53.0 %    MCV 70 (L) 78 - 100 fL    MCH 22.7 (L) 26.5 - 33.0 pg    MCHC 32.3 31.5 - 36.5 g/dL    RDW 18.2 (H) 10.0 - 15.0 %    Platelet Count 300 150 - 450 10e3/uL    NRBCs per 100 WBC 0 <1 /100    Absolute NRBCs 0.0 10e3/uL   Manual Differential   Result Value Ref Range    % Neutrophils 87 %    % Lymphocytes 5 %    % Monocytes 6 %    % Eosinophils 1 %    % Basophils 1 %    Absolute Neutrophils 21.1 (H) 1.6 - 8.3 10e3/uL    Absolute Lymphocytes 1.2 0.8 - 5.3 10e3/uL    Absolute Monocytes 1.5 (H) 0.0 - 1.3 10e3/uL    Absolute Eosinophils 0.2 0.0 - 0.7 10e3/uL    Absolute Basophils 0.2 0.0 - 0.2 10e3/uL    RBC Morphology Confirmed RBC Indices     Platelet Assessment  Automated Count Confirmed. Platelet morphology is normal.     Automated Count Confirmed. Platelet morphology is normal.    Teardrop Cells Slight (A) None Seen   CT Abdomen Pelvis w Contrast    Narrative    CT ABDOMEN AND PELVIS WITH  CONTRAST 7/5/2024 10:40 AM    CLINICAL HISTORY: Lower abdominal pain.    TECHNIQUE: CT scan of the abdomen and pelvis was performed following  injection of IV contrast. Multiplanar reformats were obtained. Dose  reduction techniques were used.    CONTRAST: 100mL, Isovue 370    COMPARISON: CT of the abdomen and pelvis 3/1/2010.    FINDINGS:   LOWER CHEST: Small hiatal hernia.    HEPATOBILIARY: No hepatic masses. The gallbladder is not seen, and is  likely surgically absent.    PANCREAS: Normal.    SPLEEN: Normal.    ADRENAL GLANDS: Normal.    KIDNEYS/BLADDER: Unremarkable. No hydronephrosis.    BOWEL: The appendix is dilated at 1.1 cm with moderate periappendiceal  inflammatory stranding. Findings are consistent with acute  appendicitis. There is an appendicolith at the base of the appendix.  No periappendiceal fluid collection to suggest abscess. No bowel  obstruction. No evidence for colitis or diverticulitis. Postoperative  changes are noted involving the stomach.    LYMPH NODES: There are several mildly prominent mesenteric lymph nodes  in the right lower quadrant, new since the previous exam and most  likely reactive, with the largest measuring 0.9 cm (series 3 image  110).    VASCULATURE: Unremarkable.    PELVIC ORGANS: Mild prostatic enlargement. The prostate gland measures  4.9 cm transverse. Small amount of free fluid in the pelvis is  nonspecific, but likely related to appendicitis.    ADDITIONAL FINDINGS: None.    MUSCULOSKELETAL: Unremarkable.      Impression    IMPRESSION:   Acute appendicitis. No evidence for associated abscess.    ANAYELI MORA MD         SYSTEM ID:  [id number]       Medications   sodium chloride 0.9% BOLUS 1,000 mL (0 mLs Intravenous Stopped 7/5/24 1102)   ketorolac (TORADOL) injection 30 mg (30 mg Intravenous $Given 7/5/24 1024)   HYDROmorphone (PF) (DILAUDID) injection 0.5 mg (0.5 mg Intravenous $Given 7/5/24 1211)   Saline 100mL Bag (60 mLs Intravenous $Given 7/5/24 1032)    iopamidol (ISOVUE-370) solution 500 mL (100 mLs Intravenous $Given 7/5/24 1033)   ampicillin-sulbactam (UNASYN) 3 g vial to attach to  mL bag (0 g Intravenous Stopped 7/5/24 1154)       Assessments & Plan (with Medical Decision Making)  Klaus is a 49-year-old male presenting with abdominal pain that started on Wednesday evening.  See history and physical exam as above  49-year-old male in no acute distress, is vitally stable and afebrile.  On exam he does have some tenderness in the lower quadrants of his abdomen, but mostly over the suprapubic region.  Exhibits some guarding.  There is no rigidity.  Normal bowel sounds throughout.  Will plan to check blood work, will also get a UA, as I suspect he may have urinary tract infection or other bladder or prostate issue given the suprapubic pain.  Also plan to get a CT scan of his abdomen pelvis since he is concerned about possible bowel obstruction and has previously had bowel surgery.  Labs and imaging as above.  He does have an elevated white count and CT revealed evidence of acute appendicitis without perforation.  IV Unasyn was initiated.  Called to speak with general surgery, but Dr. Nunez who is on-call said that he was going to come to the ER.  See his consult note for details.  Patient will be taken to the OR for laparoscopic appendectomy.  Patient has not had anything to eat today, he was drinking water on his way in and here in the ER to be able to give a urine sample.  Had been given IV Toradol and Dilaudid for pain.  Transferred to the OR in stable condition.     I have reviewed the nursing notes.    I have reviewed the findings, diagnosis, plan and need for follow up with the patient.           Medical Decision Making  The patient's presentation was of low complexity (an acute and uncomplicated illness or injury).    The patient's evaluation involved:  ordering and/or review of 3+ test(s) in this encounter (see separate area of note for  details)    The patient's management necessitated high risk (a decision regarding emergency major procedure (went to the OR with operative service)).        New Prescriptions    No medications on file       Final diagnoses:   Acute appendicitis with localized peritonitis, without perforation, abscess, or gangrene       7/5/2024   [facility] EMERGENCY DEPT       Raquel Quintanilla DO  07/05/24 3977

## 2024-07-05 NOTE — OP NOTE
Date of Service: 7/5/2024     STAFF SURGEON: Robert Nunez DO     ASSISTANT:  None     PREOPERATIVE DIAGNOSIS:  Acute appendicitis     POSTOPERATIVE DIAGNOSIS:  Acute gangrenous appendicitis     NAME OF PROCEDURE(S):  Laparoscopic appendectomy     INDICATIONS FOR PROCEDURE:  The patient is a 49 year old male whom I met in the Emergency Department after being evaluated for lower abdominal pain.  They were found to have a leukocytosis and a CT scan that showed acute appendicitis.  We discussed their options.  I recommended laparoscopic appendectomy, possible open, for acute appendicitis.  After our informed discussion, we agreed to proceed with surgery.     EBL: 5 ml    ANESTHESIA:  General endotracheal anesthesia.    COMPLICATIONS: None apparent     SPECIMENS:  Appendix     OPERATIVE FINDINGS:  gangrenous appendix with possible small perforation     PROCEDURE DETAIL:  After the informed consent was obtained, the patient was brought from the preoperative holding area to the operating room and placed in the supine position.  Anesthesia was induced. They were prepped and draped in the normal sterile fashion.  Timeout was performed.  After the correct patient and correct procedure were verified, we began by making a supraumbilical 5 mm incision.  Veress needle was inserted into the peritoneal cavity, and the abdomen was insufflated to 15 mmHg.  A 5 mm trocar was inserted.  Camera was inserted.  General survey of the abdomen revealed no gross abnormalities.  I placed a 5 mm suprapubic port and a 12 mm left lower quadrant port, both under direct visualization.  The patient was placed in Trendelenburg, left side down position.  This exposed cecum with inflamed appendix.  When grasped, the appendix did seep small amount of purulent and/or succus material consistent with possible small perforation. This was irrigated and suctioned. I was able to elevate the appendix with a grasper and make a window under the base of the  appendix with a curved [place] dissector.  Laparoscopic powered staple device was used to staple and transect the base of the appendix with a white load staple.  We then stapled and transected the mesoappendix with a gray load.  The appendix was placed in an EndoCatch bag and brought through the 12 mm incision.  Survey of the operative field revealed no apparent complications and confirmed hemostasis.  We then closed the 12 mm fascial defect with 0 Vicryl suture using a PMI closure device.  The patient's abdomen was then desufflated, while the ports were removed under direct visualization.  The skin was instilled with 0.25% Marcaine with epinephrine for local anesthesia.  Skin was closed with inverted, interrupted 4-0 Monocryl suture.  Topical adhesive dressing was applied.  At the completion of the case, all instruments, needles and sponges were accounted for after a correct count.  The patient was then awoken from anesthesia and brought to the recovery room in stable condition.    Robert Nunez DO, FACS

## 2024-07-05 NOTE — ANESTHESIA CARE TRANSFER NOTE
Patient: Beny Nelson    Procedure: Procedure(s):  APPENDECTOMY, LAPAROSCOPIC       Diagnosis: Acute appendicitis with localized peritonitis, without perforation, abscess, or gangrene [K35.30]  Diagnosis Additional Information: No value filed.    Anesthesia Type:   General     Note:    Oropharynx: oropharynx clear of all foreign objects and spontaneously breathing  Level of Consciousness: drowsy  Oxygen Supplementation: face mask    Independent Airway: airway patency satisfactory and stable  Dentition: dentition unchanged  Vital Signs Stable: post-procedure vital signs reviewed and stable  Report to RN Given: handoff report given  Patient transferred to: PACU    Handoff Report: Identifed the Patient, Identified the Reponsible Provider, Reviewed the pertinent medical history, Discussed the surgical course, Reviewed Intra-OP anesthesia mangement and issues during anesthesia, Set expectations for post-procedure period and Allowed opportunity for questions and acknowledgement of understanding      Vitals:  Vitals Value Taken Time   /76 07/05/24 1420   Temp 98.78  F (37.1  C) 07/05/24 1421   Pulse 111 07/05/24 1421   Resp 17 07/05/24 1421   SpO2 99 % 07/05/24 1421   Vitals shown include unfiled device data.    Electronically Signed By: PEDRO PABLO Kaplan CRNA  July 5, 2024  2:22 PM

## 2024-07-05 NOTE — ANESTHESIA PROCEDURE NOTES
Airway       Patient location during procedure: OR       Procedure Start/Stop Times: 7/5/2024 1:33 PM  Staff -        Performed By: CRNA  Consent for Airway        Urgency: elective  Indications and Patient Condition       Indications for airway management: kennedi-procedural       Induction type:intravenous       Mask difficulty assessment: 1 - vent by mask    Final Airway Details       Final airway type: endotracheal airway       Successful airway: ETT - single  Endotracheal Airway Details        ETT size (mm): 7.5       Cuffed: yes       Successful intubation technique: direct laryngoscopy       DL Blade Type: Nova 2       Grade View of Cords: 1       Adjucts: stylet       Position: Right       Measured from: lips       Bite block used: Oral Airway    Post intubation assessment        Placement verified by: capnometry, equal breath sounds and chest rise        Number of attempts at approach: 1       Number of other approaches attempted: 0       Secured with: plastic tape       Ease of procedure: easy       Dentition: Intact and Unchanged    Medication(s) Administered   Medication Administration Time: 7/5/2024 1:33 PM

## 2024-07-05 NOTE — ANESTHESIA PREPROCEDURE EVALUATION
Anesthesia Pre-Procedure Evaluation    Patient: Beny Nelson   MRN: [id number] : 1975        Procedure : Procedure(s):  APPENDECTOMY, LAPAROSCOPIC          Past Medical History:   Diagnosis Date    Bariatric surgery status       Past Surgical History:   Procedure Laterality Date    COLONOSCOPY N/A 2017    Procedure: COLONOSCOPY;  Colonoscopy, Combined Esophagoscopy, Gastroscopy, Duodenoscopy;  Surgeon: Richard Gomez MD;  Location: [facility]    ESOPHAGOSCOPY, GASTROSCOPY, DUODENOSCOPY (EGD), COMBINED N/A 2017    Procedure: COMBINED ESOPHAGOSCOPY, GASTROSCOPY, DUODENOSCOPY (EGD);;  Surgeon: Richard Gomez MD;  Location: [facility]    GASTRIC BYPASS        No Known Allergies   Social History     Tobacco Use    Smoking status: Never    Smokeless tobacco: Never    Tobacco comments:     no smokers in household   Substance Use Topics    Alcohol use: Yes      Wt Readings from Last 1 Encounters:   24 91.6 kg (202 lb)        Anesthesia Evaluation            ROS/MED HX  ENT/Pulmonary:  - neg pulmonary ROS     Neurologic:  - neg neurologic ROS     Cardiovascular:     (+) Dyslipidemia hypertension- -   -  - -                                      METS/Exercise Tolerance:     Hematologic:     (+)      anemia,          Musculoskeletal:  - neg musculoskeletal ROS     GI/Hepatic: Comment: Gastris bypass    (+)         appendicitis,           Renal/Genitourinary:  - neg Renal ROS     Endo:     (+)               Obesity,       Psychiatric/Substance Use:  - neg psychiatric ROS     Infectious Disease:  - neg infectious disease ROS     Malignancy:  - neg malignancy ROS     Other:  - neg other ROS          Physical Exam    Airway  airway exam normal      Mallampati: I   TM distance: > 3 FB   Neck ROM: full   Mouth opening: > 3 cm    Respiratory Devices and Support         Dental           Cardiovascular   cardiovascular exam normal       Rhythm and rate: regular and normal     Pulmonary   pulmonary exam normal  "       breath sounds clear to auscultation           OUTSIDE LABS:  CBC:   Lab Results   Component Value Date    WBC 24.3 (H) 07/05/2024    WBC 10.4 05/24/2024    HGB 13.0 (L) 07/05/2024    HGB 13.2 (L) 05/24/2024    HCT 40.3 07/05/2024    HCT 41.4 05/24/2024     07/05/2024     05/24/2024     BMP:   Lab Results   Component Value Date     (L) 07/05/2024     05/24/2024    POTASSIUM 4.4 07/05/2024    POTASSIUM 4.4 05/24/2024    CHLORIDE 99 07/05/2024    CHLORIDE 105 05/24/2024    CO2 24 07/05/2024    CO2 20 (L) 05/24/2024    BUN 9.5 07/05/2024    BUN 13.4 05/24/2024    CR 1.26 (H) 07/05/2024    CR 1.11 05/24/2024     (H) 07/05/2024    GLC 91 05/24/2024     COAGS:   Lab Results   Component Value Date    PTT 56 (H) 11/23/2005    INR 3.63 (H) 11/23/2005     POC: No results found for: \"BGM\", \"HCG\", \"HCGS\"  HEPATIC:   Lab Results   Component Value Date    ALBUMIN 4.1 07/05/2024    PROTTOTAL 7.3 07/05/2024    ALT 27 07/05/2024    AST 22 07/05/2024    ALKPHOS 110 07/05/2024    BILITOTAL 0.5 07/05/2024     OTHER:   Lab Results   Component Value Date    NAHOMI 9.5 07/05/2024    LIPASE 13 (L) 11/23/2005    TSH 1.86 04/13/2013       Anesthesia Plan    ASA Status:  2, emergent    NPO Status:  NPO Appropriate    Anesthesia Type: General.     - Airway: ETT   Induction: Intravenous, Propofol.   Maintenance: Balanced.        Consents    Anesthesia Plan(s) and associated risks, benefits, and realistic alternatives discussed. Questions answered and patient/representative(s) expressed understanding.     - Discussed:     - Discussed with:  Patient      - Extended Intubation/Ventilatory Support Discussed: No.      - Patient is DNR/DNI Status: No     Use of blood products discussed: No .     Postoperative Care    Pain management: IV analgesics, Oral pain medications.   PONV prophylaxis: Ondansetron (or other 5HT-3), Dexamethasone or Solumedrol     Comments:    Other Comments: The risks and benefits of " "anesthesia, and the alternatives where applicable, have been discussed with the patient, and they wish to proceed.              Miles Oneal, PEDRO PABLO CRNA    I have reviewed the pertinent notes and labs in the chart from the past 30 days and (re)examined the patient.  Any updates or changes from those notes are reflected in this note.     # Hyponatremia: Lowest Na = 134 mmol/L in last 30 days, will monitor as appropriate          # Obesity: Estimated body mass index is 34.67 kg/m  as calculated from the following:    Height as of this encounter: 1.626 m (5' 4\").    Weight as of this encounter: 91.6 kg (202 lb).      "

## 2024-07-05 NOTE — ANESTHESIA POSTPROCEDURE EVALUATION
Patient: Beny Nelson    Procedure: Procedure(s):  APPENDECTOMY, LAPAROSCOPIC       Anesthesia Type:  General    Note:  Disposition: Outpatient   Postop Pain Control: Uneventful            Sign Out: Well controlled pain   PONV: No   Neuro/Psych: Uneventful            Sign Out: Acceptable/Baseline neuro status   Airway/Respiratory: Uneventful            Sign Out: Acceptable/Baseline resp. status   CV/Hemodynamics: Uneventful            Sign Out: Acceptable CV status   Other NRE: NONE   DID A NON-ROUTINE EVENT OCCUR? No    Event details/Postop Comments:  Pt was happy with anesthesia care.  No complications.  I will follow up with the pt if needed.           Last vitals:  Vitals Value Taken Time   /60 07/05/24 1500   Temp 98.96  F (37.2  C) 07/05/24 1507   Pulse 101 07/05/24 1507   Resp 17 07/05/24 1507   SpO2 95 % 07/05/24 1507   Vitals shown include unfiled device data.    Electronically Signed By: PEDRO PABLO Martin CRNA  July 5, 2024  3:08 PM

## 2024-07-05 NOTE — ED TRIAGE NOTES
"Pt reports sudden \"severe\" lower quadrant belly pain since Wednesday; no change in BMs but feels pounding in head during voids; taking ASA and Advil with minimal short pain relief.      Triage Assessment (Adult)       Row Name 07/05/24 0913          Triage Assessment    Airway WDL X        Respiratory WDL    Respiratory WDL WDL        Skin Circulation/Temperature WDL    Skin Circulation/Temperature WDL WDL        Cardiac WDL    Cardiac WDL WDL        Peripheral/Neurovascular WDL    Peripheral Neurovascular WDL WDL        Cognitive/Neuro/Behavioral WDL    Cognitive/Neuro/Behavioral WDL WDL                     "

## 2024-07-05 NOTE — CONSULTS
Patient seen in consultation for acute appendicitis    HPI:  Patient is a 49 year old male with 36 hours of abdominal pain presenting to the ED for evaluation. There, he was found to have a leukocytosis and CT showing acute appendicitis with appendicolith. He's had a gastric bypass surgery in the past. He has not had an appetite today and hasn't had anything to eat.     Review Of Systems    Skin: negative  Ears/Nose/Throat: negative  Respiratory: No shortness of breath, dyspnea on exertion, cough, or hemoptysis  Cardiovascular: negative  Gastrointestinal: as above and poor appetite  Genitourinary: negative  Musculoskeletal: negative  Neurologic: negative  Hematologic/Lymphatic/Immunologic: negative  Endocrine: negative      Past Medical History:   Diagnosis Date    Bariatric surgery status        Past Surgical History:   Procedure Laterality Date    COLONOSCOPY N/A 12/8/2017    Procedure: COLONOSCOPY;  Colonoscopy, Combined Esophagoscopy, Gastroscopy, Duodenoscopy;  Surgeon: Richard Gomez MD;  Location:  GI    ESOPHAGOSCOPY, GASTROSCOPY, DUODENOSCOPY (EGD), COMBINED N/A 12/8/2017    Procedure: COMBINED ESOPHAGOSCOPY, GASTROSCOPY, DUODENOSCOPY (EGD);;  Surgeon: Richard Gomez MD;  Location:  GI    GASTRIC BYPASS  2005       Family History   Problem Relation Age of Onset    Diabetes Father     Cancer Paternal Grandfather        Social History     Socioeconomic History    Marital status:      Spouse name: Not on file    Number of children: Not on file    Years of education: Not on file    Highest education level: Not on file   Occupational History    Not on file   Tobacco Use    Smoking status: Never    Smokeless tobacco: Never    Tobacco comments:     no smokers in household   Substance and Sexual Activity    Alcohol use: Yes    Drug use: No    Sexual activity: Yes     Partners: Female     Birth control/protection: Surgical     Comment: tubal   Other Topics Concern    Parent/sibling w/ CABG, MI or  angioplasty before 65F 55M? Not Asked   Social History Narrative    Not on file     Social Determinants of Health     Financial Resource Strain: Low Risk  (5/24/2024)    Financial Resource Strain     Within the past 12 months, have you or your family members you live with been unable to get utilities (heat, electricity) when it was really needed?: No   Food Insecurity: Low Risk  (5/24/2024)    Food Insecurity     Within the past 12 months, did you worry that your food would run out before you got money to buy more?: No     Within the past 12 months, did the food you bought just not last and you didn t have money to get more?: No   Transportation Needs: Low Risk  (5/24/2024)    Transportation Needs     Within the past 12 months, has lack of transportation kept you from medical appointments, getting your medicines, non-medical meetings or appointments, work, or from getting things that you need?: No   Physical Activity: Unknown (5/24/2024)    Exercise Vital Sign     Days of Exercise per Week: 3 days     Minutes of Exercise per Session: Not on file   Stress: Stress Concern Present (5/24/2024)    [place] [employer] - Occupational Stress Questionnaire     Feeling of Stress : Very much   Social Connections: Unknown (5/24/2024)    Social Connection and Isolation Panel [NHANES]     Frequency of Communication with Friends and Family: Not on file     Frequency of Social Gatherings with Friends and Family: Once a week     Attends Restorationist Services: Not on file     Active Member of Clubs or Organizations: Not on file     Attends Club or Organization Meetings: Not on file     Marital Status: Not on file   Interpersonal Safety: Low Risk  (5/24/2024)    Interpersonal Safety     Do you feel physically and emotionally safe where you currently live?: Yes     Within the past 12 months, have you been hit, slapped, kicked or otherwise physically hurt by someone?: No     Within the past 12 months, have you been  "humiliated or emotionally abused in other ways by your partner or ex-partner?: No   Housing Stability: Low Risk  (5/24/2024)    Housing Stability     Do you have housing? : Yes     Are you worried about losing your housing?: No       Current Outpatient Medications   Medication Sig Dispense Refill    Acetaminophen (TYLENOL PO)       ibuprofen (ADVIL/MOTRIN) 200 MG tablet Take 200 mg by mouth every 4 hours as needed.      lisinopril (ZESTRIL) 10 MG tablet TAKE ONE TABLET ( 10 MG) BY MOUTH ONCE DAILY 90 tablet 3    sildenafil (VIAGRA) 100 MG tablet TAKE ONE-HALF TABLET BY MOUTH DAILY AS NEEDED  (DO NOT TAKE WITH NITROGLYCERIN, TERAZOSIN, OR DOXAZOSIN)  **NEED APPOINTMENT BEFORE FURTHER REFILLS ** 10 tablet 0    zolpidem (AMBIEN) 10 MG tablet TAKE ONE-HALF TO ONE TABLET BY MOUTH EVERY NIGHT AT BEDTIME AS NEEDED (NEED TO BE SEEN IN CLINIC FOR FURTHER REFILLS) 15 tablet 0       Medications and history reviewed    Physical exam:  Vitals: /56   Pulse 90   Temp 98.2  F (36.8  C) (Temporal)   Resp 16   Ht 1.626 m (5' 4\")   Wt 91.6 kg (202 lb)   SpO2 100%   BMI 34.67 kg/m    BMI= Body mass index is 34.67 kg/m .    Constitutional: alert, non-distressed   Head: normo-cephalic, atraumatic   Cardiovascular: RRR  Respiratory: equal chest rise, good respiratory effort, no audible wheeze  Gastrointestinal: Soft, +ttp in the RLQ, focally peritoneal signs.  : deferred  Musculoskeletal: moves all extremities   Skin: no suspicious lesions or rashes   Psychiatric: mentation appears normal, affect appropriate     Labs show:  Results for orders placed or performed during the hospital encounter of 07/05/24 (from the past 24 hour(s))   CBC with platelets differential    Narrative    The following orders were created for panel order CBC with platelets differential.  Procedure                               Abnormality         Status                     ---------                               -----------         ------             "         CBC with platelets and d...[[id number]]  Abnormal            Final result               Manual Differential[[id number]]          Abnormal            Final result                 Please view results for these tests on the individual orders.   Comprehensive metabolic panel   Result Value Ref Range    Sodium 134 (L) 135 - 145 mmol/L    Potassium 4.4 3.4 - 5.3 mmol/L    Carbon Dioxide (CO2) 24 22 - 29 mmol/L    Anion Gap 11 7 - 15 mmol/L    Urea Nitrogen 9.5 6.0 - 20.0 mg/dL    Creatinine 1.26 (H) 0.67 - 1.17 mg/dL    GFR Estimate 69 >60 mL/min/1.73m2    Calcium 9.5 8.6 - 10.0 mg/dL    Chloride 99 98 - 107 mmol/L    Glucose 102 (H) 70 - 99 mg/dL    Alkaline Phosphatase 110 40 - 150 U/L    AST 22 0 - 45 U/L    ALT 27 0 - 70 U/L    Protein Total 7.3 6.4 - 8.3 g/dL    Albumin 4.1 3.5 - 5.2 g/dL    Bilirubin Total 0.5 <=1.2 mg/dL   CBC with platelets and differential   Result Value Ref Range    WBC Count 24.3 (H) 4.0 - 11.0 10e3/uL    RBC Count 5.73 4.40 - 5.90 10e6/uL    Hemoglobin 13.0 (L) 13.3 - 17.7 g/dL    Hematocrit 40.3 40.0 - 53.0 %    MCV 70 (L) 78 - 100 fL    MCH 22.7 (L) 26.5 - 33.0 pg    MCHC 32.3 31.5 - 36.5 g/dL    RDW 18.2 (H) 10.0 - 15.0 %    Platelet Count 300 150 - 450 10e3/uL    NRBCs per 100 WBC 0 <1 /100    Absolute NRBCs 0.0 10e3/uL   Manual Differential   Result Value Ref Range    % Neutrophils 87 %    % Lymphocytes 5 %    % Monocytes 6 %    % Eosinophils 1 %    % Basophils 1 %    Absolute Neutrophils 21.1 (H) 1.6 - 8.3 10e3/uL    Absolute Lymphocytes 1.2 0.8 - 5.3 10e3/uL    Absolute Monocytes 1.5 (H) 0.0 - 1.3 10e3/uL    Absolute Eosinophils 0.2 0.0 - 0.7 10e3/uL    Absolute Basophils 0.2 0.0 - 0.2 10e3/uL    RBC Morphology Confirmed RBC Indices     Platelet Assessment  Automated Count Confirmed. Platelet morphology is normal.     Automated Count Confirmed. Platelet morphology is normal.    Teardrop Cells Slight (A) None Seen   CT Abdomen Pelvis w Contrast    Narrative    CT ABDOMEN AND PELVIS  WITH CONTRAST 7/5/2024 10:40 AM    CLINICAL HISTORY: Lower abdominal pain.    TECHNIQUE: CT scan of the abdomen and pelvis was performed following  injection of IV contrast. Multiplanar reformats were obtained. Dose  reduction techniques were used.    CONTRAST: 100mL, Isovue 370    COMPARISON: CT of the abdomen and pelvis 3/1/2010.    FINDINGS:   LOWER CHEST: Small hiatal hernia.    HEPATOBILIARY: No hepatic masses. The gallbladder is not seen, and is  likely surgically absent.    PANCREAS: Normal.    SPLEEN: Normal.    ADRENAL GLANDS: Normal.    KIDNEYS/BLADDER: Unremarkable. No hydronephrosis.    BOWEL: The appendix is dilated at 1.1 cm with moderate periappendiceal  inflammatory stranding. Findings are consistent with acute  appendicitis. There is an appendicolith at the base of the appendix.  No periappendiceal fluid collection to suggest abscess. No bowel  obstruction. No evidence for colitis or diverticulitis. Postoperative  changes are noted involving the stomach.    LYMPH NODES: There are several mildly prominent mesenteric lymph nodes  in the right lower quadrant, new since the previous exam and most  likely reactive, with the largest measuring 0.9 cm (series 3 image  110).    VASCULATURE: Unremarkable.    PELVIC ORGANS: Mild prostatic enlargement. The prostate gland measures  4.9 cm transverse. Small amount of free fluid in the pelvis is  nonspecific, but likely related to appendicitis.    ADDITIONAL FINDINGS: None.    MUSCULOSKELETAL: Unremarkable.      Impression    IMPRESSION:   1.  Acute appendicitis. No evidence for associated abscess.       Imaging shows:  Recent Results (from the past 744 hour(s))   CT Abdomen Pelvis w Contrast    Narrative    CT ABDOMEN AND PELVIS WITH CONTRAST 7/5/2024 10:40 AM    CLINICAL HISTORY: Lower abdominal pain.    TECHNIQUE: CT scan of the abdomen and pelvis was performed following  injection of IV contrast. Multiplanar reformats were obtained. Dose  reduction techniques  were used.    CONTRAST: 100mL, Isovue 370    COMPARISON: CT of the abdomen and pelvis 3/1/2010.    FINDINGS:   LOWER CHEST: Small hiatal hernia.    HEPATOBILIARY: No hepatic masses. The gallbladder is not seen, and is  likely surgically absent.    PANCREAS: Normal.    SPLEEN: Normal.    ADRENAL GLANDS: Normal.    KIDNEYS/BLADDER: Unremarkable. No hydronephrosis.    BOWEL: The appendix is dilated at 1.1 cm with moderate periappendiceal  inflammatory stranding. Findings are consistent with acute  appendicitis. There is an appendicolith at the base of the appendix.  No periappendiceal fluid collection to suggest abscess. No bowel  obstruction. No evidence for colitis or diverticulitis. Postoperative  changes are noted involving the stomach.    LYMPH NODES: There are several mildly prominent mesenteric lymph nodes  in the right lower quadrant, new since the previous exam and most  likely reactive, with the largest measuring 0.9 cm (series 3 image  110).    VASCULATURE: Unremarkable.    PELVIC ORGANS: Mild prostatic enlargement. The prostate gland measures  4.9 cm transverse. Small amount of free fluid in the pelvis is  nonspecific, but likely related to appendicitis.    ADDITIONAL FINDINGS: None.    MUSCULOSKELETAL: Unremarkable.      Impression    IMPRESSION:   1.  Acute appendicitis. No evidence for associated abscess.        Assessment:     ICD-10-CM    1. Acute appendicitis with localized peritonitis, without perforation, abscess, or gangrene  K35.30 Case Request: APPENDECTOMY, LAPAROSCOPIC     Case Request: APPENDECTOMY, LAPAROSCOPIC        Plan: We discussed acute appendicitis with appendicolith at length. I recommend lap appy. We discussed the procedure in detail. We also discussed the risks, benefits, alternatives and post-op care and restrictions. After our informed discussion we decided to proceed with the proposed surgery.    45 minutes spent by me on the date of the encounter doing chart review, history and  exam, documentation and further activities per the note    Robert Nunez, DO

## 2024-07-05 NOTE — DISCHARGE INSTRUCTIONS
Follow-up Care:    [facility]    Home Care Following Appendectomy    Dr. Nunez        Care of the Incision:  Surgical glue was used on your incision, keep it dry for 24 hours.  Then you may shower but don t submerge under water for at least 2 weeks.  Gently pat your incision dry with a freshly laundered towel.  Do not touch your incision with bare hands or pick at scabs.  Leave your incision open to air.  Cover it only if draining, clothing rubs or irritates it.    Activity:  Gradually increase your activity.  Walk short distances several times each day and increase the distance as your strength allows.  To promote circulation, do not cross your legs while sitting.  No strenuous lifting more than 15 pounds  and avoid vigorous activities or straining for 2 weeks.    Return to work is ok as long as you can adhere to the lifting and activity restrictions outlined above.  Do not drive or operate equipment while taking prescription pain medicines.  You may drive if you have stopped taking prescription pain medicines and can react quickly enough to make an emergency stop if necessary.    Diet:   Resume usual diet as tolerated.  Drink plenty of water.  Avoid foods that cause constipation.    REMEMBER--most prescription pain pills cause constipation.  Walking, extra fluids, and increased fiber (fresh fruits and vegetables, etc.) are natural remedies for constipation.  You can also take mineral oil, 1-2 Tablespoons per day.  If still constipated you may try a stool softener such as Colace or Miralax.    Call Your Physician if You Have:  Redness, increased swelling or cloudy drainage from your incision.  A temperature of more than 101 degrees F.  Worsening pain in your incision not relieved by your prescription pain pills and/or a short rest.  Abdominal distention (stomach getting very large)  Swelling in your legs  Productive cough  Burning with urination  Any questions or concerns about your recovery,  please call Business hours [phone]    After hours KIMBERLY(DYLON) [phone] Nurse Advice Line (24 hours a day)    Follow-up Care:  Make an appointment 2-3 week after your surgery, if not already made.  Call [phone].

## 2024-07-09 DIAGNOSIS — G47.00 INSOMNIA, UNSPECIFIED TYPE: ICD-10-CM

## 2024-07-09 LAB
PATH REPORT.COMMENTS IMP SPEC: NORMAL
PATH REPORT.COMMENTS IMP SPEC: NORMAL
PATH REPORT.FINAL DX SPEC: NORMAL
PATH REPORT.GROSS SPEC: NORMAL
PATH REPORT.MICROSCOPIC SPEC OTHER STN: NORMAL
PATH REPORT.RELEVANT HX SPEC: NORMAL
PHOTO IMAGE: NORMAL

## 2024-07-10 RX ORDER — ZOLPIDEM TARTRATE 10 MG/1
TABLET ORAL
Qty: 15 TABLET | Refills: 0 | Status: SHIPPED | OUTPATIENT
Start: 2024-07-10 | End: 2024-08-06

## 2024-08-05 DIAGNOSIS — G47.00 INSOMNIA, UNSPECIFIED TYPE: ICD-10-CM

## 2024-08-05 DIAGNOSIS — N52.9 IMPOTENCE OF ORGANIC ORIGIN: ICD-10-CM

## 2024-08-05 RX ORDER — SILDENAFIL 100 MG/1
TABLET, FILM COATED ORAL
Qty: 10 TABLET | Refills: 0 | Status: SHIPPED | OUTPATIENT
Start: 2024-08-05

## 2024-08-06 RX ORDER — ZOLPIDEM TARTRATE 10 MG/1
TABLET ORAL
Qty: 15 TABLET | Refills: 0 | Status: SHIPPED | OUTPATIENT
Start: 2024-08-06 | End: 2024-09-03

## 2024-09-03 DIAGNOSIS — G47.00 INSOMNIA, UNSPECIFIED TYPE: ICD-10-CM

## 2024-09-03 RX ORDER — ZOLPIDEM TARTRATE 10 MG/1
TABLET ORAL
Qty: 15 TABLET | Refills: 0 | Status: SHIPPED | OUTPATIENT
Start: 2024-09-03 | End: 2024-10-02

## 2024-10-02 DIAGNOSIS — G47.00 INSOMNIA, UNSPECIFIED TYPE: ICD-10-CM

## 2024-10-02 RX ORDER — ZOLPIDEM TARTRATE 10 MG/1
TABLET ORAL
Qty: 15 TABLET | Refills: 0 | Status: SHIPPED | OUTPATIENT
Start: 2024-10-02 | End: 2024-10-27

## 2024-10-27 ENCOUNTER — MYC REFILL (OUTPATIENT)
Dept: INTERNAL MEDICINE | Facility: CLINIC | Age: 49
End: 2024-10-27
Payer: COMMERCIAL

## 2024-10-27 DIAGNOSIS — N52.9 IMPOTENCE OF ORGANIC ORIGIN: ICD-10-CM

## 2024-10-27 DIAGNOSIS — G47.00 INSOMNIA, UNSPECIFIED TYPE: ICD-10-CM

## 2024-10-28 RX ORDER — SILDENAFIL 100 MG/1
TABLET, FILM COATED ORAL
Qty: 10 TABLET | Refills: 0 | Status: SHIPPED | OUTPATIENT
Start: 2024-10-28

## 2024-10-29 RX ORDER — ZOLPIDEM TARTRATE 10 MG/1
TABLET ORAL
Qty: 15 TABLET | Refills: 0 | Status: SHIPPED | OUTPATIENT
Start: 2024-10-29

## 2024-11-06 ENCOUNTER — TELEPHONE (OUTPATIENT)
Dept: INTERNAL MEDICINE | Facility: CLINIC | Age: 49
End: 2024-11-06
Payer: COMMERCIAL

## 2024-11-06 ENCOUNTER — MYC MEDICAL ADVICE (OUTPATIENT)
Dept: FAMILY MEDICINE | Facility: CLINIC | Age: 49
End: 2024-11-06
Payer: COMMERCIAL

## 2024-11-06 NOTE — TELEPHONE ENCOUNTER
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 PA:  Previously Tried and Failed:  na  Rationale:  na    Insurance   Primary: BLUE PLUS   Insurance ID:  [id number]     Secondary (if applicable):na  Insurance ID:  na    Pharmacy Information (if different than what is on RX)  Name:  [facility]  Phone:  [phone]  Fax:[phone]    Clinic Information  Preferred routing pool for dept communication: [facility]

## 2024-11-06 NOTE — TELEPHONE ENCOUNTER
LVM on patients mobile. Sending mychart. Per PCP does not need an appt unless patient wants to be seen to discuss any concerns.     Closing encounter.     Karely Hernández, VF

## 2024-11-07 NOTE — TELEPHONE ENCOUNTER
Central Prior Authorization Team  Phone: [phone]    PA Initiation    Medication: SILDENAFIL CITRATE 100 MG PO TABS  Insurance Company: [facility] - Phone [phone] Fax [phone]  Pharmacy Filling the Rx: [address]   Filling Pharmacy Phone: [phone]  Filling Pharmacy Fax:    Start Date: 11/7/2024

## 2024-11-11 NOTE — TELEPHONE ENCOUNTER
PRIOR AUTHORIZATION DENIED    Medication: SILDENAFIL CITRATE 100 MG PO TABS  Insurance Company: [employer] - Phone [phone] Fax [phone]  Denial Date: 11/8/2024    Denial Reason(s): Excluded benefit    Appeal Information: If provider would like to appeal please provide a letter of medical necessity.

## 2024-11-29 DIAGNOSIS — G47.00 INSOMNIA, UNSPECIFIED TYPE: ICD-10-CM

## 2024-12-02 RX ORDER — ZOLPIDEM TARTRATE 10 MG/1
TABLET ORAL
Qty: 15 TABLET | Refills: 0 | Status: SHIPPED | OUTPATIENT
Start: 2024-12-02

## 2025-01-22 DIAGNOSIS — G47.00 INSOMNIA, UNSPECIFIED TYPE: ICD-10-CM

## 2025-01-22 RX ORDER — ZOLPIDEM TARTRATE 10 MG/1
TABLET ORAL
Qty: 15 TABLET | Refills: 0 | Status: SHIPPED | OUTPATIENT
Start: 2025-01-22

## 2025-02-24 ENCOUNTER — MYC REFILL (OUTPATIENT)
Dept: INTERNAL MEDICINE | Facility: CLINIC | Age: 50
End: 2025-02-24
Payer: COMMERCIAL

## 2025-02-24 DIAGNOSIS — G47.00 INSOMNIA, UNSPECIFIED TYPE: ICD-10-CM

## 2025-02-24 DIAGNOSIS — N52.9 IMPOTENCE OF ORGANIC ORIGIN: ICD-10-CM

## 2025-02-24 RX ORDER — ZOLPIDEM TARTRATE 10 MG/1
TABLET ORAL
Qty: 15 TABLET | Refills: 0 | Status: SHIPPED | OUTPATIENT
Start: 2025-02-24

## 2025-02-24 RX ORDER — SILDENAFIL 100 MG/1
TABLET, FILM COATED ORAL
Qty: 10 TABLET | Refills: 0 | Status: SHIPPED | OUTPATIENT
Start: 2025-02-24

## 2025-03-12 ENCOUNTER — OFFICE VISIT (OUTPATIENT)
Dept: INTERNAL MEDICINE | Facility: CLINIC | Age: 50
End: 2025-03-12
Payer: COMMERCIAL

## 2025-03-12 VITALS
HEART RATE: 82 BPM | OXYGEN SATURATION: 98 % | DIASTOLIC BLOOD PRESSURE: 80 MMHG | RESPIRATION RATE: 19 BRPM | TEMPERATURE: 98.3 F | BODY MASS INDEX: 33.95 KG/M2 | SYSTOLIC BLOOD PRESSURE: 135 MMHG | WEIGHT: 191.6 LBS | HEIGHT: 63 IN

## 2025-03-12 DIAGNOSIS — I10 BENIGN ESSENTIAL HYPERTENSION: Primary | ICD-10-CM

## 2025-03-12 DIAGNOSIS — E78.5 HYPERLIPIDEMIA LDL GOAL <130: ICD-10-CM

## 2025-03-12 DIAGNOSIS — N52.9 IMPOTENCE OF ORGANIC ORIGIN: ICD-10-CM

## 2025-03-12 DIAGNOSIS — G47.00 INSOMNIA, UNSPECIFIED TYPE: ICD-10-CM

## 2025-03-12 PROBLEM — E66.01 MORBID OBESITY (H): Status: RESOLVED | Noted: 2023-03-03 | Resolved: 2025-03-12

## 2025-03-12 LAB
ALBUMIN SERPL BCG-MCNC: 4.7 G/DL (ref 3.5–5.2)
ALP SERPL-CCNC: 126 U/L (ref 40–150)
ALT SERPL W P-5'-P-CCNC: 37 U/L (ref 0–70)
ANION GAP SERPL CALCULATED.3IONS-SCNC: 12 MMOL/L (ref 7–15)
AST SERPL W P-5'-P-CCNC: 35 U/L (ref 0–45)
BILIRUB SERPL-MCNC: 0.3 MG/DL
BUN SERPL-MCNC: 9.4 MG/DL (ref 6–20)
CALCIUM SERPL-MCNC: 10.3 MG/DL (ref 8.8–10.4)
CHLORIDE SERPL-SCNC: 103 MMOL/L (ref 98–107)
CREAT SERPL-MCNC: 1.13 MG/DL (ref 0.67–1.17)
EGFRCR SERPLBLD CKD-EPI 2021: 79 ML/MIN/1.73M2
GLUCOSE SERPL-MCNC: 82 MG/DL (ref 70–99)
HCO3 SERPL-SCNC: 24 MMOL/L (ref 22–29)
POTASSIUM SERPL-SCNC: 4.7 MMOL/L (ref 3.4–5.3)
PROT SERPL-MCNC: 7.7 G/DL (ref 6.4–8.3)
SODIUM SERPL-SCNC: 139 MMOL/L (ref 135–145)

## 2025-03-12 PROCEDURE — 36415 COLL VENOUS BLD VENIPUNCTURE: CPT | Performed by: INTERNAL MEDICINE

## 2025-03-12 PROCEDURE — 80053 COMPREHEN METABOLIC PANEL: CPT | Performed by: INTERNAL MEDICINE

## 2025-03-12 PROCEDURE — 99214 OFFICE O/P EST MOD 30 MIN: CPT | Performed by: INTERNAL MEDICINE

## 2025-03-12 RX ORDER — ZOLPIDEM TARTRATE 10 MG/1
10 TABLET ORAL
COMMUNITY
Start: 2025-03-12

## 2025-03-12 RX ORDER — SILDENAFIL 100 MG/1
100 TABLET, FILM COATED ORAL DAILY PRN
COMMUNITY
Start: 2025-03-12

## 2025-03-12 ASSESSMENT — PAIN SCALES - GENERAL: PAINLEVEL_OUTOF10: NO PAIN (0)

## 2025-03-12 NOTE — PROGRESS NOTES
Luisa Swan is a 49 year old, presenting for the following health issues:  Recheck Medication      3/12/2025     3:08 PM   Additional Questions   Roomed by Zuleyma JARVIS     History of Present Illness       Reason for visit:  Doctor left message to come in on prescriptions    He eats 0-1 servings of fruits and vegetables daily.He consumes 0 sweetened beverage(s) daily.He exercises with enough effort to increase his heart rate 30 to 60 minutes per day.  He exercises with enough effort to increase his heart rate 4 days per week.   He is taking medications regularly.    .                   EMR reviewed including:             Complaint, History of Chief Complaint, Corresponding Review of Systems, and Complaint Specific Physical Examination.    #1   Follow-up on hypertension  Blood pressure today on recheck is 135/80.  Patient continues to take lisinopril 10 mg daily.  Denies cough or other untoward effect.  Denies headaches, shortness of breath, chest pain.          Exam:   LUNGS: clear bilaterally, airflow is brisk, no intercostal retraction or stridor is noted. No coughing is noted during visit.   HEART:  regular without rubs, clicks, gallops, or murmurs. PMI is nondisplaced. Upstrokes are brisk. S1,S2 are heard.   GI: Abdomen is soft, without rebound, guarding or tenderness. Bowel sounds are appropriate. No renal bruits are heard.   NEURO: Pt is alert and appropriate. No neurologic lateralization is noted. Cranial nerves 2-12 are intact. Peripheral sensory and motor function are grossly normal.       #2   Ongoing erectile dysfunction  Now requiring 100 mg of the Viagra together appropriate response.  Denies any headache with the medication.  Denies any priapism or side effects.      #3   Persistent insomnia.  Has been on the Ambien for years.  Generally takes 10 mg at bedtime.  Denies daytime grogginess.  Multiple attempts at weaning the medication have been unsuccessful.  Patient is a very busy businessman  "and has difficulty \"turning his brain off\".   reviewed demonstrating no concerns regarding overusage.          Exam:   Constitutional: The patient appears to be in no acute distress. The patient appears to be adequately hydrated. No acute respiratory or hemodynamic distress is noted at this time.   PSYCH: The patient appears grossly appropriate. Maintains good eye contact, does not have any jittery or atypical motion. Displays appropriate affect.        Patient has been interviewed, applicable history and applied review of systems have been performed.    Vital Signs:   /80   Pulse 82   Temp 98.3  F (36.8  C) (Temporal)   Resp 19   Ht 1.6 m (5' 3\")   Wt 86.9 kg (191 lb 9.6 oz)   SpO2 98%   BMI 33.94 kg/m        Decision Making    Problem and Complexity     1. Benign essential hypertension (Primary)  Check renal function and electrolytes.  Continue current dose of lisinopril.  Adjust dosage as needed.  - Comprehensive metabolic panel (BMP + Alb, Alk Phos, ALT, AST, Total. Bili, TP); Future  - Comprehensive metabolic panel (BMP + Alb, Alk Phos, ALT, AST, Total. Bili, TP)    2. Hyperlipidemia LDL goal <130  Continue low-fat diet.  Not fasting today.  Recommend checking lipid levels at next visit.    3. Insomnia, unspecified type  Continue the zolpidem as current.  Patient will continue to use on an as-needed basis.  - zolpidem (AMBIEN) 10 MG tablet; Take 1 tablet (10 mg) by mouth nightly as needed for sleep.    4. Impotence of organic origin  Continue the Viagra 100 mg daily as needed.  - sildenafil (VIAGRA) 100 MG tablet; Take 1 tablet (100 mg) by mouth daily as needed (ed).                                FOLLOW UP   I have asked the patient to make an appointment for followup with me in 6 months or as needed in the interim    Regarding routine vaccinations:  I have reviewed the patient's vaccination schedule and discussed the benefits of prophylactic vaccination in detail.  I recommend the patient " contact their pharmacist for vaccinations.  Discussed that most insurance companies now favor reimbursement to the pharmacies and it will financially behoove the patient to have vaccinations performed at their pharmacy.        I have carefully explained the diagnosis and treatment options to the patient.  The patient has displayed an understanding of the above, and all subsequent questions were answered.      DO LENCHO Bui    Portions of this note were produced using [employer]  Although every attempt at real-time proof reading has been made, occasional grammar/syntax errors may have been missed.

## 2025-03-23 ENCOUNTER — MYC REFILL (OUTPATIENT)
Dept: INTERNAL MEDICINE | Facility: CLINIC | Age: 50
End: 2025-03-23
Payer: COMMERCIAL

## 2025-03-23 DIAGNOSIS — N52.9 IMPOTENCE OF ORGANIC ORIGIN: ICD-10-CM

## 2025-03-23 DIAGNOSIS — G47.00 INSOMNIA, UNSPECIFIED TYPE: ICD-10-CM

## 2025-03-24 RX ORDER — ZOLPIDEM TARTRATE 10 MG/1
10 TABLET ORAL
Qty: 30 TABLET | Refills: 2 | Status: SHIPPED | OUTPATIENT
Start: 2025-03-24

## 2025-03-24 RX ORDER — SILDENAFIL 100 MG/1
100 TABLET, FILM COATED ORAL DAILY PRN
Qty: 15 TABLET | Refills: 3 | Status: SHIPPED | OUTPATIENT
Start: 2025-03-24

## 2025-03-24 NOTE — TELEPHONE ENCOUNTER
Medication passed protocol, however, refill RN could not approve because  sildenafil is listed as historical.  Provider, please approve or deny the prescription.

## 2025-05-08 ENCOUNTER — PATIENT OUTREACH (OUTPATIENT)
Dept: CARE COORDINATION | Facility: CLINIC | Age: 50
End: 2025-05-08
Payer: COMMERCIAL

## 2025-07-26 ENCOUNTER — MYC REFILL (OUTPATIENT)
Dept: INTERNAL MEDICINE | Facility: CLINIC | Age: 50
End: 2025-07-26
Payer: COMMERCIAL

## 2025-07-26 DIAGNOSIS — I10 BENIGN ESSENTIAL HYPERTENSION: ICD-10-CM

## 2025-07-26 DIAGNOSIS — G47.00 INSOMNIA, UNSPECIFIED TYPE: ICD-10-CM

## 2025-07-28 RX ORDER — ZOLPIDEM TARTRATE 10 MG/1
10 TABLET ORAL
Qty: 30 TABLET | Refills: 2 | Status: SHIPPED | OUTPATIENT
Start: 2025-07-28

## 2025-07-28 RX ORDER — LISINOPRIL 10 MG/1
10 TABLET ORAL
Qty: 90 TABLET | Refills: 2 | OUTPATIENT
Start: 2025-07-28

## (undated) DEVICE — STPL POWERED ECHELON 45MM PSEE45A

## (undated) DEVICE — SU MONOCRYL 4-0 PS-2 18" UND Y496G

## (undated) DEVICE — SUCTION MANIFOLD NEPTUNE 2 SYS 4 PORT 0702-020-000

## (undated) DEVICE — ESU CORD MONOPOLAR HIGH FREQUENCY 26006M-D/10

## (undated) DEVICE — NDL INSUFFLATION 13GA 120MM C2201

## (undated) DEVICE — SOL NACL 0.9% INJ 1000ML BAG 07983-09

## (undated) DEVICE — ENDO TROCAR SLEEVE KII Z-THREADED 05X100MM CTS02

## (undated) DEVICE — STPL RELOAD REG TISSUE ECHELON 45 X 3.6MM BLUE GST45B

## (undated) DEVICE — GLOVE BIOGEL PI ULTRATOUCH G SZ 7.5 42175

## (undated) DEVICE — ENDO POUCH UNIV RETRIEVAL SYSTEM INZII 10MM CD001

## (undated) DEVICE — ENDO TROCAR FIRST ENTRY KII FIOS Z-THRD 05X100MM CTF03

## (undated) DEVICE — STPL ENDO RELOAD ECHELON 45X2.0MM GREY ECR45M

## (undated) DEVICE — ENDO TROCAR FIRST ENTRY KII FIOS Z-THRD 12X100MM CTF73

## (undated) DEVICE — SUCTION IRR STRYKERFLOW II W/TIP 250-070-520

## (undated) DEVICE — PACK GENERAL LAPAOSCOPY

## (undated) DEVICE — DEVICE SUTURE GRASPER TROCAR CLOSURE 14GA PMITCSG

## (undated) DEVICE — ESU GROUND PAD UNIVERSAL W/O CORD

## (undated) DEVICE — SU DERMABOND ADVANCED .7ML DNX12

## (undated) DEVICE — GLOVE BIOGEL PI INDICATOR 8.0 LF 41680

## (undated) RX ORDER — FENTANYL CITRATE 50 UG/ML
INJECTION, SOLUTION INTRAMUSCULAR; INTRAVENOUS
Status: DISPENSED
Start: 2024-07-05

## (undated) RX ORDER — HYDROMORPHONE HYDROCHLORIDE 1 MG/ML
INJECTION, SOLUTION INTRAMUSCULAR; INTRAVENOUS; SUBCUTANEOUS
Status: DISPENSED
Start: 2024-07-05

## (undated) RX ORDER — FENTANYL CITRATE 50 UG/ML
INJECTION, SOLUTION INTRAMUSCULAR; INTRAVENOUS
Status: DISPENSED
Start: 2017-12-08

## (undated) RX ORDER — ONDANSETRON 2 MG/ML
INJECTION INTRAMUSCULAR; INTRAVENOUS
Status: DISPENSED
Start: 2024-07-05

## (undated) RX ORDER — CEFAZOLIN SODIUM/WATER 2 G/20 ML
SYRINGE (ML) INTRAVENOUS
Status: DISPENSED
Start: 2024-07-05

## (undated) RX ORDER — DEXAMETHASONE SODIUM PHOSPHATE 10 MG/ML
INJECTION, SOLUTION INTRAMUSCULAR; INTRAVENOUS
Status: DISPENSED
Start: 2024-07-05